# Patient Record
Sex: MALE | Race: WHITE | NOT HISPANIC OR LATINO | Employment: OTHER | URBAN - METROPOLITAN AREA
[De-identification: names, ages, dates, MRNs, and addresses within clinical notes are randomized per-mention and may not be internally consistent; named-entity substitution may affect disease eponyms.]

---

## 2019-08-13 RX ORDER — CLOPIDOGREL BISULFATE 75 MG/1
75 TABLET ORAL DAILY
Qty: 90 TABLET | Refills: 3 | Status: SHIPPED | OUTPATIENT
Start: 2019-08-13 | End: 2020-08-21

## 2019-08-13 RX ORDER — ATORVASTATIN CALCIUM 20 MG/1
20 TABLET, FILM COATED ORAL DAILY
Qty: 90 TABLET | Refills: 3 | Status: SHIPPED | OUTPATIENT
Start: 2019-08-13 | End: 2020-08-21

## 2019-08-13 RX ORDER — METOPROLOL TARTRATE 50 MG/1
50 TABLET, FILM COATED ORAL DAILY
Qty: 90 TABLET | Refills: 3 | Status: SHIPPED | OUTPATIENT
Start: 2019-08-13 | End: 2019-09-12

## 2019-09-12 RX ORDER — METOPROLOL SUCCINATE 50 MG/1
50 TABLET, EXTENDED RELEASE ORAL DAILY
Qty: 90 TABLET | Refills: 3 | Status: SHIPPED | OUTPATIENT
Start: 2019-09-12 | End: 2020-08-21

## 2019-09-12 NOTE — TELEPHONE ENCOUNTER
Patient called and l/m v/m that incorrect Metoprolol was sent to pharmacy. Stated he has been on Succ ER and what was recently sent was Tartrate .   Metoprolol rx corrected and resent.

## 2019-09-19 ENCOUNTER — OFFICE VISIT (OUTPATIENT)
Dept: CARDIOLOGY | Facility: CLINIC | Age: 69
End: 2019-09-19

## 2019-09-19 VITALS
DIASTOLIC BLOOD PRESSURE: 75 MMHG | RESPIRATION RATE: 18 BRPM | SYSTOLIC BLOOD PRESSURE: 145 MMHG | WEIGHT: 260 LBS | HEART RATE: 52 BPM | OXYGEN SATURATION: 97 %

## 2019-09-19 DIAGNOSIS — E78.5 DYSLIPIDEMIA: ICD-10-CM

## 2019-09-19 DIAGNOSIS — I11.9 HYPERTENSIVE HEART DISEASE WITHOUT HEART FAILURE: ICD-10-CM

## 2019-09-19 DIAGNOSIS — I25.10 CHRONIC CORONARY ARTERY DISEASE: ICD-10-CM

## 2019-09-19 DIAGNOSIS — I10 ESSENTIAL HYPERTENSION: ICD-10-CM

## 2019-09-19 DIAGNOSIS — I48.0 PAROXYSMAL ATRIAL FIBRILLATION (HCC): Primary | ICD-10-CM

## 2019-09-19 DIAGNOSIS — E66.9 CLASS 2 OBESITY WITHOUT SERIOUS COMORBIDITY WITH BODY MASS INDEX (BMI) OF 35.0 TO 35.9 IN ADULT, UNSPECIFIED OBESITY TYPE: ICD-10-CM

## 2019-09-19 DIAGNOSIS — D68.9 COAGULOPATHY (HCC): ICD-10-CM

## 2019-09-19 PROCEDURE — 99214 OFFICE O/P EST MOD 30 MIN: CPT | Performed by: INTERNAL MEDICINE

## 2019-09-19 PROCEDURE — 93000 ELECTROCARDIOGRAM COMPLETE: CPT | Performed by: INTERNAL MEDICINE

## 2019-09-19 NOTE — PROGRESS NOTES
Cardiology Office Visit      Encounter Date:  09/19/2019    Patient ID:   Nikolay Lam is a 68 y.o. male.    Reason For Followup:  Coronary artery disease  Paroxysmal atrial fibrillation  Hypertension  Coagulopathy    Brief Clinical History:  Dear Josep Edwards MD    I had the pleasure of seeing Nikolay Lam today. As you are well aware, this is a 68 y.o. male with a history of ischemic heart disease and proximal atrial fibrillation. He has additional history of dyslipidemia and hypertension. He presents today for follow-up on the above conditions.    Interval History:  He denies any chest pain pressure heaviness or tightness.  He denies any shortness of breath.  He denies any PND orthopnea.  He denies any syncope or near-syncope.  He reports feeling quite well from a cardiac perspective. His BP is better at home, 120 systolic this morning. 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    Assessment & Plan    Impressions:  Coronary artery disease status post PCI of the ostial right Coronary artery with a drug-eluting stent in December 2015.  Persistent AF with elevated Dell score. Now status post DC cardioversion. Maintaining sinus rhythm.  Coagulopathy secondary to  Eliquis  Hypertension with HTCV. Suboptimal today but was normal at PCP last week  Obesity.  Dyslipidemia.  Hypertension with hypertensive cardiovascular disease. - suboptimal, situational component noted.    Recommendations:  Continuation of his current medical regimen at the present time.  Follow-up in 6 months times initiate difficulties.    Objective:    Vitals:  Vitals:    09/19/19 1135   BP: 145/75   Pulse: 52   Resp: 18   SpO2: 97%   Weight: 118 kg (260 lb)       Physical Exam:    General: Alert, cooperative, no distress, appears stated age  Head:  Normocephalic, atraumatic, mucous membranes moist  Eyes:  Conjunctiva/corneas clear, EOM's intact     Neck:  Supple,  no adenopathy;      Lungs: Clear to  auscultation bilaterally, no wheezes rhonchi rales are noted  Chest wall: No tenderness  Heart::  Regular rate and rhythm, S1 and S2 normal, 2/6 holosystolic murmur.  No rub or gallop  Abdomen: Soft, non-tender, nondistended bowel sounds active  Extremities: No cyanosis, clubbing, or edema  Pulses: 2+ and symmetric all extremities  Skin:  No rashes or lesions  Neuro/psych: A&O x3. CN II through XII are grossly intact with appropriate affect      Allergies:  No Known Allergies    Medication Review:     Current Outpatient Medications:   •  atorvastatin (LIPITOR) 20 MG tablet, Take 1 tablet by mouth Daily., Disp: 90 tablet, Rfl: 3  •  Azilsartan-Chlorthalidone (EDARBYCLOR) 40-12.5 MG tablet, Take 1 tablet by mouth Daily., Disp: , Rfl:   •  clopidogrel (PLAVIX) 75 MG tablet, Take 1 tablet by mouth Daily., Disp: 90 tablet, Rfl: 3  •  metoprolol succinate XL (TOPROL-XL) 50 MG 24 hr tablet, Take 1 tablet by mouth Daily., Disp: 90 tablet, Rfl: 3    Family History:  Family History   Problem Relation Age of Onset   • Colon cancer Mother    • Parkinsonism Father        Past Medical History:  Past Medical History:   Diagnosis Date   • Atrial fibrillation (CMS/HCC)    • Coagulopathy (CMS/HCC)    • Coronary artery disease    • Dyslipidemia    • Hypertension    • Hypertensive cardiovascular disease    • Obesity        Past surgical History:  Past Surgical History:   Procedure Laterality Date   • CARDIAC CATHETERIZATION     • CARDIOVERSION  12/2015   • CORONARY ANGIOPLASTY WITH STENT PLACEMENT  12/18/2015    Xience Alpine stent to ostial RCA- [facility]   • KNEE SURGERY Left 01/04/2012   • SHOULDER SURGERY Left        Social History:  Social History     Socioeconomic History   • Marital status:      Spouse name: Not on file   • Number of children: Not on file   • Years of education: Not on file   • Highest education level: Not on file   Tobacco Use   • Smoking status: Former Smoker   • Smokeless tobacco: Never  Used   Substance and Sexual Activity   • Alcohol use: Yes     Frequency: 4 or more times a week   • Drug use: No       Review of Systems:  The following systems were reviewed as they relate to the cardiovascular system: Constitutional, Eyes, ENT, Cardiovascular, Respiratory, Gastrointestinal, Integumentary, Neurological, Psychiatric, Hematologic, Endocrine, Musculoskeletal, and Genitourinary. The pertinent cardiovascular findings are reported above with all other cardiovascular points within those systems being negative.    Diagnostic Study Review:     Current Electrocardiogram:    ECG 12 Lead  Date/Time: 9/19/2019 1:48 PM  Performed by: Adán Harding DO  Authorized by: Adán Harding DO   Comparison: not compared with previous ECG   Previous ECG: no previous ECG available  Comments: Sinus bradycardia with a ventricular rate of 53 bpm.  Early R wave transition.  Normal QT and QTc intervals.  Left axis deviation.            Most recent Cardiac Catheterization:  Date:  Findings:    Most Recent Echocardiogram:  Date:  Findings:    Most Recent Stress Test:  Date:  Findings:    Most Recent Ambulatory ECG Monitor:  Date:  Findings:    NOTE: The following portions of the patient's history were reviewed and updated this visit as appropriate: allergies, current medications, past family history, past medical history, past social history, past surgical history and problem list.

## 2019-09-20 ENCOUNTER — LAB (OUTPATIENT)
Dept: LAB | Facility: HOSPITAL | Age: 69
End: 2019-09-20

## 2019-09-20 DIAGNOSIS — I48.0 PAROXYSMAL ATRIAL FIBRILLATION (HCC): ICD-10-CM

## 2019-09-20 DIAGNOSIS — I10 ESSENTIAL HYPERTENSION: ICD-10-CM

## 2019-09-20 LAB
ALBUMIN SERPL-MCNC: 3.8 G/DL (ref 3.5–4.8)
ALBUMIN/GLOB SERPL: 1.5 G/DL (ref 1–1.7)
ALP SERPL-CCNC: 53 U/L (ref 32–91)
ALT SERPL W P-5'-P-CCNC: 17 U/L (ref 17–63)
ANION GAP SERPL CALCULATED.3IONS-SCNC: 12.4 MMOL/L (ref 5–15)
ARTICHOKE IGE QN: 101 MG/DL (ref 0–100)
AST SERPL-CCNC: 21 U/L (ref 15–41)
BASOPHILS # BLD AUTO: 0 10*3/MM3 (ref 0–0.2)
BASOPHILS NFR BLD AUTO: 0.6 % (ref 0–1.5)
BILIRUB SERPL-MCNC: 0.8 MG/DL (ref 0.3–1.2)
BUN BLD-MCNC: 19 MG/DL (ref 8–20)
BUN/CREAT SERPL: 19 (ref 6.2–20.3)
CALCIUM SPEC-SCNC: 8.8 MG/DL (ref 8.9–10.3)
CHLORIDE SERPL-SCNC: 105 MMOL/L (ref 101–111)
CHOLEST SERPL-MCNC: 165 MG/DL
CO2 SERPL-SCNC: 25 MMOL/L (ref 22–32)
CREAT BLD-MCNC: 1 MG/DL (ref 0.7–1.2)
DEPRECATED RDW RBC AUTO: 42.4 FL (ref 37–54)
EOSINOPHIL # BLD AUTO: 0.1 10*3/MM3 (ref 0–0.4)
EOSINOPHIL NFR BLD AUTO: 1.9 % (ref 0.3–6.2)
ERYTHROCYTE [DISTWIDTH] IN BLOOD BY AUTOMATED COUNT: 13.4 % (ref 12.3–15.4)
GFR SERPL CREATININE-BSD FRML MDRD: 74 ML/MIN/1.73
GLOBULIN UR ELPH-MCNC: 2.6 GM/DL (ref 2.5–3.8)
GLUCOSE BLD-MCNC: 114 MG/DL (ref 65–99)
HCT VFR BLD AUTO: 38.3 % (ref 37.5–51)
HDLC SERPL QL: 3
HDLC SERPL-MCNC: 55 MG/DL
HGB BLD-MCNC: 13.2 G/DL (ref 13–17.7)
LDLC/HDLC SERPL: 1.83 {RATIO}
LYMPHOCYTES # BLD AUTO: 1.3 10*3/MM3 (ref 0.7–3.1)
LYMPHOCYTES NFR BLD AUTO: 22.6 % (ref 19.6–45.3)
MCH RBC QN AUTO: 31.6 PG (ref 26.6–33)
MCHC RBC AUTO-ENTMCNC: 34.6 G/DL (ref 31.5–35.7)
MCV RBC AUTO: 91.5 FL (ref 79–97)
MONOCYTES # BLD AUTO: 0.5 10*3/MM3 (ref 0.1–0.9)
MONOCYTES NFR BLD AUTO: 9.2 % (ref 5–12)
NEUTROPHILS # BLD AUTO: 3.7 10*3/MM3 (ref 1.7–7)
NEUTROPHILS NFR BLD AUTO: 65.7 % (ref 42.7–76)
NRBC BLD AUTO-RTO: 0.1 /100 WBC (ref 0–0.2)
PLATELET # BLD AUTO: 215 10*3/MM3 (ref 140–450)
PMV BLD AUTO: 8.2 FL (ref 6–12)
POTASSIUM BLD-SCNC: 4.4 MMOL/L (ref 3.6–5.1)
PROT SERPL-MCNC: 6.4 G/DL (ref 6.1–7.9)
RBC # BLD AUTO: 4.18 10*6/MM3 (ref 4.14–5.8)
SODIUM BLD-SCNC: 138 MMOL/L (ref 136–144)
TRIGL SERPL-MCNC: 46 MG/DL
VLDLC SERPL-MCNC: 9.2 MG/DL
WBC NRBC COR # BLD: 5.6 10*3/MM3 (ref 3.4–10.8)

## 2019-09-20 PROCEDURE — 80053 COMPREHEN METABOLIC PANEL: CPT

## 2019-09-20 PROCEDURE — 80061 LIPID PANEL: CPT

## 2019-09-20 PROCEDURE — 85025 COMPLETE CBC W/AUTO DIFF WBC: CPT

## 2019-09-20 PROCEDURE — 36415 COLL VENOUS BLD VENIPUNCTURE: CPT

## 2020-04-17 ENCOUNTER — TELEMEDICINE (OUTPATIENT)
Dept: CARDIOLOGY | Facility: CLINIC | Age: 70
End: 2020-04-17

## 2020-04-17 VITALS — SYSTOLIC BLOOD PRESSURE: 129 MMHG | WEIGHT: 260 LBS | HEART RATE: 53 BPM | DIASTOLIC BLOOD PRESSURE: 71 MMHG

## 2020-04-17 DIAGNOSIS — I25.10 CHRONIC CORONARY ARTERY DISEASE: ICD-10-CM

## 2020-04-17 DIAGNOSIS — I11.9 HYPERTENSIVE HEART DISEASE WITHOUT HEART FAILURE: ICD-10-CM

## 2020-04-17 DIAGNOSIS — I48.0 PAROXYSMAL ATRIAL FIBRILLATION (HCC): Primary | ICD-10-CM

## 2020-04-17 DIAGNOSIS — D68.9 COAGULOPATHY (HCC): ICD-10-CM

## 2020-04-17 DIAGNOSIS — H92.01 EAR PAIN, RIGHT: ICD-10-CM

## 2020-04-17 DIAGNOSIS — I10 ESSENTIAL HYPERTENSION: ICD-10-CM

## 2020-04-17 DIAGNOSIS — E78.5 DYSLIPIDEMIA: ICD-10-CM

## 2020-04-17 PROCEDURE — 99214 OFFICE O/P EST MOD 30 MIN: CPT | Performed by: INTERNAL MEDICINE

## 2020-04-17 NOTE — PROGRESS NOTES
Cardiology Virtual Video Visit      Encounter Date:  04/17/2020    Patient ID:   Nikolay Lam is a 69 y.o. male.    Reason For Followup:  Coronary artery disease  Paroxysmal atrial fibrillation  Hypertension  Coagulopathy    Brief Clinical History:  Dear Josep Edwards MD    I had the pleasure of seeing Nikolay Lam today in a virtual video format. As you are well aware, this is a 69 y.o. male with a history of ischemic heart disease and proximal atrial fibrillation. He has additional history of dyslipidemia and hypertension. He presents today for follow-up on the above conditions.     Interval History:  He denies any chest pain pressure heaviness or tightness.  He denies any shortness of breath.  He denies any PND orthopnea.  He denies any syncope or near-syncope.  He reports feeling quite well from a cardiac perspective.     He is reporting some tingling around the rim of right ear. He reports it is intermittent and fleeting. It may last a few seconds and goes away.He reports no aggravating factors but does report that if he lays on his right side with his head down he has no symptoms. We discussed these symptoms. He has no associated signs or symptoms suggestive of cardiac or vascular etiology.  He will reach out to you to discuss further.     Assessment & Plan     Impressions:  Coronary artery disease status post PCI of the ostial right Coronary artery with a drug-eluting stent in December 2015.  Persistent AF with elevated Dell score. Now status post DC cardioversion. Maintaining sinus rhythm.  Coagulopathy secondary to  Eliquis  Hypertension with HTCV. Suboptimal today but was normal at PCP last week  Obesity.  Dyslipidemia.  Hypertension with hypertensive cardiovascular disease. - suboptimal, situational component noted.     Recommendations:  Continuation of his current medical regimen at the present time.  Follow-up in 6 months times initiate difficulties.    Objective:    Vitals:  Vitals:     04/17/20 1002   BP: 129/71   Pulse: 53   Weight: 118 kg (260 lb)         Allergies:  No Known Allergies    Medication Review:     Current Outpatient Medications:   •  atorvastatin (LIPITOR) 20 MG tablet, Take 1 tablet by mouth Daily., Disp: 90 tablet, Rfl: 3  •  Azilsartan-Chlorthalidone (EDARBYCLOR) 40-12.5 MG tablet, Take 1 tablet by mouth Daily., Disp: , Rfl:   •  clopidogrel (PLAVIX) 75 MG tablet, Take 1 tablet by mouth Daily., Disp: 90 tablet, Rfl: 3  •  metoprolol succinate XL (TOPROL-XL) 50 MG 24 hr tablet, Take 1 tablet by mouth Daily., Disp: 90 tablet, Rfl: 3    Family History:  Family History   Problem Relation Age of Onset   • Colon cancer Mother    • Parkinsonism Father        Past Medical History:  Past Medical History:   Diagnosis Date   • Atrial fibrillation (CMS/HCC)    • Coagulopathy (CMS/HCC)    • Coronary artery disease    • Dyslipidemia    • Hypertension    • Hypertensive cardiovascular disease    • Obesity        Past surgical History:  Past Surgical History:   Procedure Laterality Date   • CARDIAC CATHETERIZATION     • CARDIOVERSION  12/2015   • CORONARY ANGIOPLASTY WITH STENT PLACEMENT  12/18/2015    Xience Alpine stent to ostial RCA- [facility]   • KNEE SURGERY Left 01/04/2012   • SHOULDER SURGERY Left        Social History:  Social History     Socioeconomic History   • Marital status:      Spouse name: Not on file   • Number of children: Not on file   • Years of education: Not on file   • Highest education level: Not on file   Tobacco Use   • Smoking status: Former Smoker   • Smokeless tobacco: Never Used   Substance and Sexual Activity   • Alcohol use: Yes     Frequency: 4 or more times a week   • Drug use: No       Review of Systems:  The following systems were reviewed as they relate to the cardiovascular system: Constitutional, Eyes, ENT, Cardiovascular, Respiratory, Gastrointestinal, Integumentary, Neurological, Psychiatric, Hematologic, Endocrine, Musculoskeletal,  and Genitourinary. The pertinent cardiovascular findings are reported above with all other cardiovascular points within those systems being negative.    Diagnostic Study Review:     Current Electrocardiogram:  Procedures      NOTE: The following portions of the patient's history were reviewed and updated this visit as appropriate: allergies, current medications, past family history, past medical history, past social history, past surgical history and problem list.    A total of 25 minutes of medical discussion occurred during this encounter.      Novel Coronavirus (COVID-19) Disclaimer:     A proclamation declaring a national emergency concerning the Novel Coronavirus Disease (COVID-19) Outbreak was issued on March 13, 2020 at the direction of the .    This virtual visit was performed with the patient's consent in lieu of the patient's regularly scheduled appointment in order to provide the patient with the opportunity to maintain contact with their healthcare provider while also adhering to social distancing guidelines set forth by the CDC to reduce exposure to the Novel Coronavirus (COVID-19).  Any vital signs obtained during this visit were provided by the patient.

## 2020-05-21 RX ORDER — AZILSARTAN KAMEDOXOMIL AND CHLORTHALIDONE 40; 12.5 MG/1; MG/1
TABLET ORAL
Qty: 90 TABLET | Refills: 4 | Status: SHIPPED | OUTPATIENT
Start: 2020-05-21 | End: 2021-05-13

## 2020-07-10 ENCOUNTER — OFFICE (AMBULATORY)
Dept: URBAN - METROPOLITAN AREA PATHOLOGY 4 | Facility: PATHOLOGY | Age: 70
End: 2020-07-10
Payer: COMMERCIAL

## 2020-07-10 ENCOUNTER — OFFICE (AMBULATORY)
Dept: URBAN - METROPOLITAN AREA PATHOLOGY 4 | Facility: PATHOLOGY | Age: 70
End: 2020-07-10
Payer: MEDICARE

## 2020-07-10 ENCOUNTER — ON CAMPUS - OUTPATIENT (AMBULATORY)
Dept: URBAN - METROPOLITAN AREA HOSPITAL 2 | Facility: HOSPITAL | Age: 70
End: 2020-07-10
Payer: MEDICARE

## 2020-07-10 VITALS
HEIGHT: 72 IN | SYSTOLIC BLOOD PRESSURE: 94 MMHG | SYSTOLIC BLOOD PRESSURE: 128 MMHG | RESPIRATION RATE: 80 BRPM | DIASTOLIC BLOOD PRESSURE: 56 MMHG | RESPIRATION RATE: 18 BRPM | DIASTOLIC BLOOD PRESSURE: 60 MMHG | DIASTOLIC BLOOD PRESSURE: 64 MMHG | OXYGEN SATURATION: 99 % | OXYGEN SATURATION: 98 % | OXYGEN SATURATION: 95 % | RESPIRATION RATE: 14 BRPM | DIASTOLIC BLOOD PRESSURE: 40 MMHG | WEIGHT: 260 LBS | RESPIRATION RATE: 16 BRPM | SYSTOLIC BLOOD PRESSURE: 112 MMHG | DIASTOLIC BLOOD PRESSURE: 68 MMHG | HEART RATE: 53 BPM | SYSTOLIC BLOOD PRESSURE: 92 MMHG | SYSTOLIC BLOOD PRESSURE: 100 MMHG | DIASTOLIC BLOOD PRESSURE: 58 MMHG | SYSTOLIC BLOOD PRESSURE: 95 MMHG | HEART RATE: 54 BPM | HEART RATE: 60 BPM | DIASTOLIC BLOOD PRESSURE: 62 MMHG | DIASTOLIC BLOOD PRESSURE: 50 MMHG | SYSTOLIC BLOOD PRESSURE: 91 MMHG | TEMPERATURE: 97.7 F | HEART RATE: 46 BPM

## 2020-07-10 DIAGNOSIS — D12.4 BENIGN NEOPLASM OF DESCENDING COLON: ICD-10-CM

## 2020-07-10 DIAGNOSIS — K63.5 POLYP OF COLON: ICD-10-CM

## 2020-07-10 DIAGNOSIS — Z12.11 ENCOUNTER FOR SCREENING FOR MALIGNANT NEOPLASM OF COLON: ICD-10-CM

## 2020-07-10 DIAGNOSIS — Z80.0 FAMILY HISTORY OF MALIGNANT NEOPLASM OF DIGESTIVE ORGANS: ICD-10-CM

## 2020-07-10 DIAGNOSIS — K64.0 FIRST DEGREE HEMORRHOIDS: ICD-10-CM

## 2020-07-10 DIAGNOSIS — K62.89 OTHER SPECIFIED DISEASES OF ANUS AND RECTUM: ICD-10-CM

## 2020-07-10 DIAGNOSIS — D12.0 BENIGN NEOPLASM OF CECUM: ICD-10-CM

## 2020-07-10 LAB
GI HISTOLOGY: A. UNSPECIFIED: (no result)
GI HISTOLOGY: B. UNSPECIFIED: (no result)
GI HISTOLOGY: PDF REPORT: (no result)

## 2020-07-10 PROCEDURE — 88305 TISSUE EXAM BY PATHOLOGIST: CPT | Mod: 26 | Performed by: INTERNAL MEDICINE

## 2020-07-10 PROCEDURE — 45380 COLONOSCOPY AND BIOPSY: CPT | Performed by: INTERNAL MEDICINE

## 2020-08-21 RX ORDER — METOPROLOL SUCCINATE 50 MG/1
TABLET, EXTENDED RELEASE ORAL
Qty: 90 TABLET | Refills: 1 | Status: SHIPPED | OUTPATIENT
Start: 2020-08-21 | End: 2021-02-16 | Stop reason: SDUPTHER

## 2020-08-21 RX ORDER — CLOPIDOGREL BISULFATE 75 MG/1
TABLET ORAL
Qty: 90 TABLET | Refills: 1 | Status: SHIPPED | OUTPATIENT
Start: 2020-08-21 | End: 2021-02-16 | Stop reason: SDUPTHER

## 2020-08-21 RX ORDER — ATORVASTATIN CALCIUM 20 MG/1
TABLET, FILM COATED ORAL
Qty: 90 TABLET | Refills: 1 | Status: SHIPPED | OUTPATIENT
Start: 2020-08-21 | End: 2021-02-16 | Stop reason: SDUPTHER

## 2020-10-20 PROBLEM — H35.379 EPIRETINAL MEMBRANE: Status: ACTIVE | Noted: 2017-07-13

## 2020-10-20 PROBLEM — H25.819 COMBINED FORM OF SENILE CATARACT: Status: ACTIVE | Noted: 2017-06-01

## 2020-10-20 PROBLEM — H43.819 VITREOUS DEGENERATION: Status: ACTIVE | Noted: 2017-06-01

## 2020-10-20 PROBLEM — H34.8390 BRANCH RETINAL VEIN OCCLUSION WITH MACULAR EDEMA: Status: ACTIVE | Noted: 2017-06-01

## 2020-10-20 RX ORDER — ASPIRIN 325 MG
TABLET ORAL
COMMUNITY

## 2020-10-20 RX ORDER — VALSARTAN AND HYDROCHLOROTHIAZIDE 160; 12.5 MG/1; MG/1
1 TABLET, FILM COATED ORAL DAILY
COMMUNITY
End: 2020-10-29

## 2020-10-20 RX ORDER — CHLORAL HYDRATE 500 MG
CAPSULE ORAL
COMMUNITY

## 2020-10-29 ENCOUNTER — OFFICE VISIT (OUTPATIENT)
Dept: CARDIOLOGY | Facility: CLINIC | Age: 70
End: 2020-10-29

## 2020-10-29 VITALS
SYSTOLIC BLOOD PRESSURE: 148 MMHG | DIASTOLIC BLOOD PRESSURE: 79 MMHG | WEIGHT: 271 LBS | HEART RATE: 57 BPM | OXYGEN SATURATION: 97 %

## 2020-10-29 DIAGNOSIS — I25.10 CHRONIC CORONARY ARTERY DISEASE: Primary | ICD-10-CM

## 2020-10-29 DIAGNOSIS — I11.9 HYPERTENSIVE HEART DISEASE WITHOUT HEART FAILURE: ICD-10-CM

## 2020-10-29 DIAGNOSIS — I48.0 PAROXYSMAL ATRIAL FIBRILLATION (HCC): ICD-10-CM

## 2020-10-29 DIAGNOSIS — I10 ESSENTIAL HYPERTENSION: ICD-10-CM

## 2020-10-29 PROCEDURE — 99214 OFFICE O/P EST MOD 30 MIN: CPT | Performed by: INTERNAL MEDICINE

## 2020-10-29 NOTE — PROGRESS NOTES
Cardiology Office Visit      Encounter Date:  10/29/2020    Patient ID:   Nikolay Lam is a 69 y.o. male.    Reason For Followup:  Coronary artery disease  Paroxysmal atrial fibrillation    Brief Clinical History:  Dear Josep Edwards MD    I had the pleasure of seeing Nikolay Lam today. As you are well aware, this is a 69 y.o. male with a history of ischemic heart disease and proximal atrial fibrillation. He has additional history of dyslipidemia and hypertension. He presents today for follow-up on the above conditions.     Interval History:  He denies any chest pain pressure heaviness or tightness.  He denies any shortness of breath.  He denies any PND orthopnea.  He denies any syncope or near-syncope.  He reports feeling quite well from a cardiac perspective.    His blood pressure is elevated today however at home it was 131 systolic.  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     Assessment & Plan     Impressions:  Coronary artery disease status post PCI of the ostial right Coronary artery with a drug-eluting stent in December 2015.  Persistent AF with elevated Dell score. Now status post DC cardioversion. Maintaining sinus rhythm.  Coagulopathy secondary to  Eliquis  Hypertension with HTCV. Suboptimal today but was normal at PCP last week  Obesity.  Dyslipidemia.  Hypertension with hypertensive cardiovascular disease. - suboptimal, situational component noted.     Recommendations:  Continuation of his current medical regimen at the present time.  Follow-up in 6 months times initiate difficulties.    Objective:    Vitals:  Vitals:    10/29/20 0945   BP: 148/79   Pulse: 57   SpO2: 97%   Weight: 123 kg (271 lb)       Physical Exam:    General: Alert, cooperative, no distress, appears stated age  Head:  Normocephalic, atraumatic, mucous membranes moist  Eyes:  Conjunctiva/corneas clear, EOM's intact     Neck:  Supple,  no bruit  Lungs: Clear to auscultation bilaterally, no wheezes  rhonchi rales are noted  Chest wall: No tenderness  Heart::  Regular rate and rhythm, S1 and S2 normal, 1/6 holosystolic murmur.  No rub or gallop  Abdomen: Soft, non-tender, nondistended bowel sounds active  Extremities: No cyanosis, clubbing, or edema  Pulses: 2+ and symmetric all extremities  Skin:  No rashes or lesions  Neuro/psych: A&O x3. CN II through XII are grossly intact with appropriate affect      Allergies:  No Known Allergies    Medication Review:     Current Outpatient Medications:   •  aspirin 325 MG tablet, ASPIRIN 325 MG TABS, Disp: , Rfl:   •  atorvastatin (LIPITOR) 20 MG tablet, Take 1 tablet by mouth once daily, Disp: 90 tablet, Rfl: 1  •  clopidogrel (PLAVIX) 75 MG tablet, Take 1 tablet by mouth once daily, Disp: 90 tablet, Rfl: 1  •  EDARBYCLOR 40-12.5 MG tablet, TAKE ONE TABLET BY MOUTH DAILY , Disp: 90 tablet, Rfl: 4  •  metoprolol succinate XL (TOPROL-XL) 50 MG 24 hr tablet, Take 1 tablet by mouth once daily, Disp: 90 tablet, Rfl: 1  •  Omega-3 Fatty Acids (fish oil) 1000 MG capsule capsule, FISH OIL 1000 MG CAPS, Disp: , Rfl:   •  valsartan-hydrochlorothiazide (DIOVAN-HCT) 160-12.5 MG per tablet, Take 1 tablet by mouth Daily., Disp: , Rfl:     Family History:  Family History   Problem Relation Age of Onset   • Colon cancer Mother    • Parkinsonism Father        Past Medical History:  Past Medical History:   Diagnosis Date   • Atrial fibrillation (CMS/HCC)    • Coagulopathy (CMS/HCC)    • Coronary artery disease    • Dyslipidemia    • Hypertension    • Hypertensive cardiovascular disease    • Obesity        Past surgical History:  Past Surgical History:   Procedure Laterality Date   • CARDIAC CATHETERIZATION     • CARDIOVERSION  12/2015   • CORONARY ANGIOPLASTY WITH STENT PLACEMENT  12/18/2015    Xience Alpine stent to ostial RCA- [facility]   • KNEE SURGERY Left 01/04/2012   • SHOULDER SURGERY Left        Social History:  Social History     Socioeconomic History   • Marital  status:      Spouse name: Not on file   • Number of children: Not on file   • Years of education: Not on file   • Highest education level: Not on file   Tobacco Use   • Smoking status: Former Smoker   • Smokeless tobacco: Never Used   Substance and Sexual Activity   • Alcohol use: Yes     Frequency: 4 or more times a week   • Drug use: No       Review of Systems:  The following systems were reviewed as they relate to the cardiovascular system: Constitutional, Eyes, ENT, Cardiovascular, Respiratory, Gastrointestinal, Integumentary, Neurological, Psychiatric, Hematologic, Endocrine, Musculoskeletal, and Genitourinary. The pertinent cardiovascular findings are reported above with all other cardiovascular points within those systems being negative.    Diagnostic Study Review:     Current Electrocardiogram:    ECG 12 Lead    Date/Time: 10/30/2020 8:18 AM  Performed by: Adán Harding DO  Authorized by: Adán Harding DO   Comparison: not compared with previous ECG   Previous ECG: no previous ECG available  Comments: Normal sinus rhythm with a ventricular rate of 59 bpm.  Normal QT and QTc intervals.              NOTE: The following portions of the patient's history were reviewed and updated this visit as appropriate: allergies, current medications, past family history, past medical history, past social history, past surgical history and problem list.

## 2020-10-30 PROCEDURE — 93000 ELECTROCARDIOGRAM COMPLETE: CPT | Performed by: INTERNAL MEDICINE

## 2021-02-16 RX ORDER — ATORVASTATIN CALCIUM 20 MG/1
20 TABLET, FILM COATED ORAL DAILY
Qty: 90 TABLET | Refills: 1 | Status: SHIPPED | OUTPATIENT
Start: 2021-02-16 | End: 2021-08-12 | Stop reason: SDUPTHER

## 2021-02-16 RX ORDER — CLOPIDOGREL BISULFATE 75 MG/1
75 TABLET ORAL DAILY
Qty: 90 TABLET | Refills: 1 | Status: SHIPPED | OUTPATIENT
Start: 2021-02-16 | End: 2021-08-12 | Stop reason: SDUPTHER

## 2021-02-16 RX ORDER — METOPROLOL SUCCINATE 50 MG/1
50 TABLET, EXTENDED RELEASE ORAL DAILY
Qty: 90 TABLET | Refills: 1 | Status: SHIPPED | OUTPATIENT
Start: 2021-02-16 | End: 2021-08-12 | Stop reason: SDUPTHER

## 2021-04-29 ENCOUNTER — OFFICE VISIT (OUTPATIENT)
Dept: CARDIOLOGY | Facility: CLINIC | Age: 71
End: 2021-04-29

## 2021-04-29 VITALS
SYSTOLIC BLOOD PRESSURE: 155 MMHG | OXYGEN SATURATION: 95 % | BODY MASS INDEX: 36.98 KG/M2 | HEIGHT: 72 IN | RESPIRATION RATE: 18 BRPM | WEIGHT: 273 LBS | DIASTOLIC BLOOD PRESSURE: 71 MMHG | HEART RATE: 63 BPM

## 2021-04-29 DIAGNOSIS — I10 ESSENTIAL HYPERTENSION: ICD-10-CM

## 2021-04-29 DIAGNOSIS — D68.9 COAGULOPATHY (HCC): ICD-10-CM

## 2021-04-29 DIAGNOSIS — N52.9 ERECTILE DYSFUNCTION, UNSPECIFIED ERECTILE DYSFUNCTION TYPE: ICD-10-CM

## 2021-04-29 DIAGNOSIS — I48.0 PAROXYSMAL ATRIAL FIBRILLATION (HCC): Primary | ICD-10-CM

## 2021-04-29 DIAGNOSIS — I11.9 HYPERTENSIVE HEART DISEASE WITHOUT HEART FAILURE: ICD-10-CM

## 2021-04-29 DIAGNOSIS — I25.10 CHRONIC CORONARY ARTERY DISEASE: ICD-10-CM

## 2021-04-29 DIAGNOSIS — M25.561 RIGHT KNEE PAIN, UNSPECIFIED CHRONICITY: ICD-10-CM

## 2021-04-29 PROCEDURE — 93000 ELECTROCARDIOGRAM COMPLETE: CPT | Performed by: INTERNAL MEDICINE

## 2021-04-29 PROCEDURE — 99214 OFFICE O/P EST MOD 30 MIN: CPT | Performed by: INTERNAL MEDICINE

## 2021-04-29 NOTE — PROGRESS NOTES
Cardiology Office Visit      Encounter Date:  04/29/2021    Patient ID:   Nikolay Lam is a 70 y.o. male.    Reason For Followup:  Coronary artery disease  Paroxysmal atrial fibrillation    Brief Clinical History:  Dear Josep Edwards MD    I had the pleasure of seeing Nikolay Lam today. As you are well aware, this is a 70 y.o. male with a history of ischemic heart disease and proximal atrial fibrillation. He has additional history of dyslipidemia and hypertension. He presents today for follow-up on the above conditions.     Interval History:  He denies any chest pain pressure heaviness or tightness.  He denies any shortness of breath.  He denies any PND orthopnea.  He denies any syncope or near-syncope.  He reports feeling quite well from a cardiac perspective.    His blood pressure is elevated today however at home traditionally it runs much lower.  He does have some discomfort in his right knee.  He is contemplating surgery.  This may be contributing to his elevated blood pressure as well.  He has an appointment to see orthopedics next week and will decide at that time whether to move forward with surgery or not.  If he needs surgery he will need an ischemic work-up.    He also is reporting some difficulty with occasional erectile dysfunction.  We reviewed his meds and his clinical conditions.  I see no contraindication for phosphodiesterase inhibitors should he decide to move forward with those.     Assessment & Plan     Impressions:  Coronary artery disease status post PCI of the ostial right Coronary artery with a drug-eluting stent in December 2015.  Persistent AF with elevated Dell score. Now status post DC cardioversion. Maintaining sinus rhythm.  Coagulopathy secondary to  Eliquis  Hypertension with HTCV. Suboptimal today but was normal at PCP last week  Obesity.  Dyslipidemia.  Hypertension with hypertensive cardiovascular disease. - suboptimal, situational component noted.  Erectile  "dysfunction     Recommendations:  Continuation of his current medical regimen at the present time.  Notify us if you decide to move forward with surgery-will need ischemic work-up for a stratification  Low risk for complications if patient chooses to utilize phosphodiesterase inhibitors  Follow-up in 6 months times initiate difficulties.    Objective:    Vitals:  Vitals:    04/29/21 0951   BP: 155/71   BP Location: Left arm   Patient Position: Sitting   Cuff Size: Large Adult   Pulse: 63   Resp: 18   SpO2: 95%   Weight: 124 kg (273 lb)   Height: 182.9 cm (72\")       Physical Exam:    General: Alert, cooperative, no distress, appears stated age  Head:  Normocephalic, atraumatic, mucous membranes moist  Eyes:  Conjunctiva/corneas clear, EOM's intact     Neck:  Supple,  no bruit  Lungs: Clear to auscultation bilaterally, no wheezes rhonchi rales are noted  Chest wall: No tenderness  Heart::  Regular rate and rhythm, S1 and S2 normal, 1/6 holosystolic murmur.  No rub or gallop  Abdomen: Soft, non-tender, nondistended bowel sounds active  Extremities: No cyanosis, clubbing, or edema  Pulses: 2+ and symmetric all extremities  Skin:  No rashes or lesions  Neuro/psych: A&O x3. CN II through XII are grossly intact with appropriate affect      Allergies:  No Known Allergies    Medication Review:     Current Outpatient Medications:   •  aspirin 325 MG tablet, ASPIRIN 325 MG TABS, Disp: , Rfl:   •  atorvastatin (LIPITOR) 20 MG tablet, Take 1 tablet by mouth Daily., Disp: 90 tablet, Rfl: 1  •  clopidogrel (PLAVIX) 75 MG tablet, Take 1 tablet by mouth Daily., Disp: 90 tablet, Rfl: 1  •  EDARBYCLOR 40-12.5 MG tablet, TAKE ONE TABLET BY MOUTH DAILY , Disp: 90 tablet, Rfl: 4  •  metoprolol succinate XL (TOPROL-XL) 50 MG 24 hr tablet, Take 1 tablet by mouth Daily., Disp: 90 tablet, Rfl: 1  •  Omega-3 Fatty Acids (fish oil) 1000 MG capsule capsule, FISH OIL 1000 MG CAPS, Disp: , Rfl:     Family History:  Family History   Problem " Relation Age of Onset   • Colon cancer Mother    • Parkinsonism Father        Past Medical History:  Past Medical History:   Diagnosis Date   • Atrial fibrillation (CMS/HCC)    • Coagulopathy (CMS/HCC)    • Coronary artery disease    • Dyslipidemia    • Hypertension    • Hypertensive cardiovascular disease    • Obesity        Past surgical History:  Past Surgical History:   Procedure Laterality Date   • CARDIAC CATHETERIZATION     • CARDIOVERSION  12/2015   • CORONARY ANGIOPLASTY WITH STENT PLACEMENT  12/18/2015    Xience Alpine stent to ostial RCA- [facility]   • KNEE SURGERY Left 01/04/2012   • SHOULDER SURGERY Left        Social History:  Social History     Socioeconomic History   • Marital status:      Spouse name: Not on file   • Number of children: Not on file   • Years of education: Not on file   • Highest education level: Not on file   Tobacco Use   • Smoking status: Former Smoker   • Smokeless tobacco: Never Used   Vaping Use   • Vaping Use: Never used   Substance and Sexual Activity   • Alcohol use: Yes   • Drug use: No       Review of Systems:  The following systems were reviewed as they relate to the cardiovascular system: Constitutional, Eyes, ENT, Cardiovascular, Respiratory, Gastrointestinal, Integumentary, Neurological, Psychiatric, Hematologic, Endocrine, Musculoskeletal, and Genitourinary. The pertinent cardiovascular findings are reported above with all other cardiovascular points within those systems being negative.    Diagnostic Study Review:     Current Electrocardiogram:    ECG 12 Lead    Date/Time: 4/29/2021 12:37 PM  Performed by: Adán Harding DO  Authorized by: Adán Harding DO   Comparison: not compared with previous ECG   Previous ECG: no previous ECG available  Comments: Normal sinus rhythm with a ventricular to 57 bpm.  Early R wave transition.  Normal QT and QTC intervals.  Normal QRS axis.              NOTE: The following portions of  the patient's note were reviewed, confirmed and/or updated this visit as appropriate: History of present illness/Interval history, physical examination, assessment & plan, allergies, current medications, past family history, past medical history, past social history, past surgical history and problem list.

## 2021-05-06 DIAGNOSIS — Z01.810 PREOPERATIVE CARDIOVASCULAR EXAMINATION: Primary | ICD-10-CM

## 2021-05-07 ENCOUNTER — TELEPHONE (OUTPATIENT)
Dept: CARDIOLOGY | Facility: CLINIC | Age: 71
End: 2021-05-07

## 2021-05-07 NOTE — TELEPHONE ENCOUNTER
Pt wife is aware the orders are placed, and MORGAN Omer,  was notified to schedule the testing. She will call the office with any further questions.

## 2021-05-07 NOTE — TELEPHONE ENCOUNTER
----- Message from Adán Harding DO sent at 5/6/2021  9:03 AM EDT -----  Done  ----- Message -----  From: Bailey Morrissey MA  Sent: 5/5/2021   1:50 PM EDT  To: Adán Harding DO    Pt spouse called stating the pt would like to proceed with surgery. She is wanting to schedule the stress test, can you put in the order?

## 2021-05-13 RX ORDER — AZILSARTAN KAMEDOXOMIL AND CHLORTHALIDONE 40; 12.5 MG/1; MG/1
TABLET ORAL
Qty: 90 TABLET | Refills: 3 | Status: SHIPPED | OUTPATIENT
Start: 2021-05-13 | End: 2021-08-12 | Stop reason: SDUPTHER

## 2021-05-26 ENCOUNTER — HOSPITAL ENCOUNTER (OUTPATIENT)
Dept: NUCLEAR MEDICINE | Facility: HOSPITAL | Age: 71
Discharge: HOME | End: 2021-05-26

## 2021-05-26 ENCOUNTER — HOSPITAL ENCOUNTER (OUTPATIENT)
Dept: CARDIOLOGY | Facility: HOSPITAL | Age: 71
Discharge: HOME | End: 2021-05-26

## 2021-05-26 DIAGNOSIS — Z01.810 PREOPERATIVE CARDIOVASCULAR EXAMINATION: ICD-10-CM

## 2021-05-26 LAB
BH CV REST NUCLEAR ISOTOPE DOSE: 7.9 MCI
BH CV STRESS BP STAGE 1: NORMAL
BH CV STRESS BP STAGE 2: NORMAL
BH CV STRESS COMMENTS STAGE 1: NORMAL
BH CV STRESS COMMENTS STAGE 2: NORMAL
BH CV STRESS DOSE REGADENOSON STAGE 1: 0.4
BH CV STRESS DURATION MIN STAGE 1: 0
BH CV STRESS DURATION MIN STAGE 2: 4
BH CV STRESS DURATION SEC STAGE 1: 10
BH CV STRESS DURATION SEC STAGE 2: 0
BH CV STRESS HR STAGE 1: 73
BH CV STRESS HR STAGE 2: 61
BH CV STRESS NUCLEAR ISOTOPE DOSE: 21.9 MCI
BH CV STRESS PROTOCOL 1: NORMAL
BH CV STRESS RECOVERY BP: NORMAL MMHG
BH CV STRESS RECOVERY HR: 61 BPM
BH CV STRESS STAGE 1: 1
BH CV STRESS STAGE 2: 2
LV EF NUC BP: 60 %
MAXIMAL PREDICTED HEART RATE: 148 BPM
PERCENT MAX PREDICTED HR: 49.32 %
STRESS BASELINE BP: NORMAL MMHG
STRESS BASELINE HR: 53 BPM
STRESS PERCENT HR: 58 %
STRESS POST PEAK BP: NORMAL MMHG
STRESS POST PEAK HR: 73 BPM
STRESS TARGET HR: 126 BPM

## 2021-05-26 PROCEDURE — 93306 TTE W/DOPPLER COMPLETE: CPT | Performed by: INTERNAL MEDICINE

## 2021-05-26 PROCEDURE — 0 TECHNETIUM SESTAMIBI: Performed by: INTERNAL MEDICINE

## 2021-05-26 PROCEDURE — 93306 TTE W/DOPPLER COMPLETE: CPT

## 2021-05-26 PROCEDURE — 25010000002 REGADENOSON 0.4 MG/5ML SOLUTION: Performed by: INTERNAL MEDICINE

## 2021-05-26 PROCEDURE — 78452 HT MUSCLE IMAGE SPECT MULT: CPT

## 2021-05-26 PROCEDURE — 93016 CV STRESS TEST SUPVJ ONLY: CPT | Performed by: INTERNAL MEDICINE

## 2021-05-26 PROCEDURE — 93018 CV STRESS TEST I&R ONLY: CPT | Performed by: INTERNAL MEDICINE

## 2021-05-26 PROCEDURE — 78452 HT MUSCLE IMAGE SPECT MULT: CPT | Performed by: INTERNAL MEDICINE

## 2021-05-26 PROCEDURE — A9500 TC99M SESTAMIBI: HCPCS | Performed by: INTERNAL MEDICINE

## 2021-05-26 PROCEDURE — 93017 CV STRESS TEST TRACING ONLY: CPT

## 2021-05-26 RX ADMIN — TECHNETIUM TC 99M SESTAMIBI 1 DOSE: 1 INJECTION INTRAVENOUS at 07:50

## 2021-05-26 RX ADMIN — REGADENOSON 0.4 MG: 0.08 INJECTION, SOLUTION INTRAVENOUS at 09:46

## 2021-05-26 RX ADMIN — TECHNETIUM TC 99M SESTAMIBI 1 DOSE: 1 INJECTION INTRAVENOUS at 09:46

## 2021-05-27 DIAGNOSIS — I77.9 AORTIC DISEASE (HCC): Primary | ICD-10-CM

## 2021-05-27 LAB
BH CV ECHO MEAS - ACS: 2.5 CM
BH CV ECHO MEAS - AO MAX PG (FULL): 0.56 MMHG
BH CV ECHO MEAS - AO MAX PG: 6.8 MMHG
BH CV ECHO MEAS - AO MEAN PG (FULL): 0.05 MMHG
BH CV ECHO MEAS - AO MEAN PG: 2.7 MMHG
BH CV ECHO MEAS - AO ROOT AREA (BSA CORRECTED): 1.6
BH CV ECHO MEAS - AO ROOT AREA: 12 CM^2
BH CV ECHO MEAS - AO ROOT DIAM: 3.9 CM
BH CV ECHO MEAS - AO V2 MAX: 130.6 CM/SEC
BH CV ECHO MEAS - AO V2 MEAN: 75 CM/SEC
BH CV ECHO MEAS - AO V2 VTI: 29.1 CM
BH CV ECHO MEAS - ASC AORTA: 4.5 CM
BH CV ECHO MEAS - AVA(I,A): 4.8 CM^2
BH CV ECHO MEAS - AVA(I,D): 4.8 CM^2
BH CV ECHO MEAS - AVA(V,A): 4.5 CM^2
BH CV ECHO MEAS - AVA(V,D): 4.5 CM^2
BH CV ECHO MEAS - BSA(HAYCOCK): 2.6 M^2
BH CV ECHO MEAS - BSA: 2.4 M^2
BH CV ECHO MEAS - BZI_BMI: 37 KILOGRAMS/M^2
BH CV ECHO MEAS - BZI_METRIC_HEIGHT: 182.9 CM
BH CV ECHO MEAS - BZI_METRIC_WEIGHT: 123.8 KG
BH CV ECHO MEAS - EDV(CUBED): 111.8 ML
BH CV ECHO MEAS - EDV(MOD-SP4): 124.4 ML
BH CV ECHO MEAS - EDV(TEICH): 108.5 ML
BH CV ECHO MEAS - EF(CUBED): 75.9 %
BH CV ECHO MEAS - EF(MOD-BP): 66 %
BH CV ECHO MEAS - EF(MOD-SP4): 66.3 %
BH CV ECHO MEAS - EF(TEICH): 67.8 %
BH CV ECHO MEAS - ESV(CUBED): 27 ML
BH CV ECHO MEAS - ESV(MOD-SP4): 42 ML
BH CV ECHO MEAS - ESV(TEICH): 35 ML
BH CV ECHO MEAS - FS: 37.8 %
BH CV ECHO MEAS - IVS/LVPW: 1.1
BH CV ECHO MEAS - IVSD: 1.5 CM
BH CV ECHO MEAS - LA DIMENSION(2D): 4.9 CM
BH CV ECHO MEAS - LA DIMENSION: 5.1 CM
BH CV ECHO MEAS - LA/AO: 1.3
BH CV ECHO MEAS - LV DIASTOLIC VOL/BSA (35-75): 51.1 ML/M^2
BH CV ECHO MEAS - LV MASS(C)D: 294.3 GRAMS
BH CV ECHO MEAS - LV MASS(C)DI: 121 GRAMS/M^2
BH CV ECHO MEAS - LV MAX PG: 6.3 MMHG
BH CV ECHO MEAS - LV MEAN PG: 2.7 MMHG
BH CV ECHO MEAS - LV SYSTOLIC VOL/BSA (12-30): 17.3 ML/M^2
BH CV ECHO MEAS - LV V1 MAX: 125.1 CM/SEC
BH CV ECHO MEAS - LV V1 MEAN: 74.7 CM/SEC
BH CV ECHO MEAS - LV V1 VTI: 29.7 CM
BH CV ECHO MEAS - LVIDD: 4.8 CM
BH CV ECHO MEAS - LVIDS: 3 CM
BH CV ECHO MEAS - LVOT AREA: 4.7 CM^2
BH CV ECHO MEAS - LVOT DIAM: 2.5 CM
BH CV ECHO MEAS - LVPWD: 1.4 CM
BH CV ECHO MEAS - MV A MAX VEL: 89.8 CM/SEC
BH CV ECHO MEAS - MV DEC SLOPE: 507.1 CM/SEC^2
BH CV ECHO MEAS - MV DEC TIME: 0.19 SEC
BH CV ECHO MEAS - MV E MAX VEL: 96.9 CM/SEC
BH CV ECHO MEAS - MV E/A: 1.1
BH CV ECHO MEAS - MV MAX PG: 5.4 MMHG
BH CV ECHO MEAS - MV MEAN PG: 1.7 MMHG
BH CV ECHO MEAS - MV V2 MAX: 115.8 CM/SEC
BH CV ECHO MEAS - MV V2 MEAN: 56.9 CM/SEC
BH CV ECHO MEAS - MV V2 VTI: 35 CM
BH CV ECHO MEAS - MVA(VTI): 4 CM^2
BH CV ECHO MEAS - PA ACC TIME: 0.07 SEC
BH CV ECHO MEAS - PA MAX PG (FULL): 1.1 MMHG
BH CV ECHO MEAS - PA MAX PG: 3.8 MMHG
BH CV ECHO MEAS - PA MEAN PG (FULL): 0.84 MMHG
BH CV ECHO MEAS - PA MEAN PG: 2.1 MMHG
BH CV ECHO MEAS - PA PR(ACCEL): 49.4 MMHG
BH CV ECHO MEAS - PA V2 MAX: 97.2 CM/SEC
BH CV ECHO MEAS - PA V2 MEAN: 68.1 CM/SEC
BH CV ECHO MEAS - PA V2 VTI: 25.4 CM
BH CV ECHO MEAS - PVA(I,A): 3.5 CM^2
BH CV ECHO MEAS - PVA(I,D): 3.5 CM^2
BH CV ECHO MEAS - PVA(V,A): 3.7 CM^2
BH CV ECHO MEAS - PVA(V,D): 3.7 CM^2
BH CV ECHO MEAS - QP/QS: 0.63
BH CV ECHO MEAS - RAP SYSTOLE: 3 MMHG
BH CV ECHO MEAS - RV MAX PG: 2.7 MMHG
BH CV ECHO MEAS - RV MEAN PG: 1.2 MMHG
BH CV ECHO MEAS - RV V1 MAX: 82.4 CM/SEC
BH CV ECHO MEAS - RV V1 MEAN: 51.4 CM/SEC
BH CV ECHO MEAS - RV V1 VTI: 20.1 CM
BH CV ECHO MEAS - RVDD: 3.7 CM
BH CV ECHO MEAS - RVOT AREA: 4.4 CM^2
BH CV ECHO MEAS - RVOT DIAM: 2.4 CM
BH CV ECHO MEAS - RVSP: 20.8 MMHG
BH CV ECHO MEAS - SI(AO): 144 ML/M^2
BH CV ECHO MEAS - SI(CUBED): 34.9 ML/M^2
BH CV ECHO MEAS - SI(LVOT): 57.7 ML/M^2
BH CV ECHO MEAS - SI(MOD-SP4): 33.9 ML/M^2
BH CV ECHO MEAS - SI(TEICH): 30.2 ML/M^2
BH CV ECHO MEAS - SV(AO): 350.2 ML
BH CV ECHO MEAS - SV(CUBED): 84.9 ML
BH CV ECHO MEAS - SV(LVOT): 140.2 ML
BH CV ECHO MEAS - SV(MOD-SP4): 82.4 ML
BH CV ECHO MEAS - SV(RVOT): 87.8 ML
BH CV ECHO MEAS - SV(TEICH): 73.5 ML
BH CV ECHO MEAS - TR MAX VEL: 205.6 CM/SEC
LV EF 2D ECHO EST: 65 %

## 2021-06-10 ENCOUNTER — HOSPITAL ENCOUNTER (OUTPATIENT)
Dept: CT IMAGING | Facility: HOSPITAL | Age: 71
Discharge: HOME | End: 2021-06-10
Admitting: INTERNAL MEDICINE

## 2021-06-10 DIAGNOSIS — I77.9 AORTIC DISEASE (HCC): ICD-10-CM

## 2021-06-10 LAB — CREAT BLDA-MCNC: 0.9 MG/DL (ref 0.6–1.3)

## 2021-06-10 PROCEDURE — 82565 ASSAY OF CREATININE: CPT

## 2021-06-10 PROCEDURE — 71275 CT ANGIOGRAPHY CHEST: CPT

## 2021-06-10 PROCEDURE — 0 IOPAMIDOL PER 1 ML: Performed by: INTERNAL MEDICINE

## 2021-06-10 RX ADMIN — IOPAMIDOL 100 ML: 755 INJECTION, SOLUTION INTRAVENOUS at 10:28

## 2021-07-15 ENCOUNTER — TELEPHONE (OUTPATIENT)
Dept: CARDIOLOGY | Facility: CLINIC | Age: 71
End: 2021-07-15

## 2021-07-15 DIAGNOSIS — I71.21 ASCENDING AORTIC ANEURYSM (HCC): Primary | ICD-10-CM

## 2021-07-15 NOTE — TELEPHONE ENCOUNTER
----- Message from Adán Harding DO sent at 7/15/2021 12:38 PM EDT -----  Regarding: FW: Referral to Dr. Sánchez's office    ----- Message -----  From: Celia Khalil MA  Sent: 7/7/2021  10:17 AM EDT  To: Adán Harding DO  Subject: Referral to Dr. Sánchez's office                   Hey when you get a chance can you please put in a referral for Nikolay Lam to see Dr. Sánchez for the asending aortic aneurysm.     Thank you!

## 2021-07-15 NOTE — TELEPHONE ENCOUNTER
Spoke with pt wife she is aware referral has been sent to Dr Sánchez, and Pt will receive call with appt details. She states she will wait to here from us.

## 2021-08-12 ENCOUNTER — OFFICE VISIT (OUTPATIENT)
Dept: CARDIAC SURGERY | Facility: CLINIC | Age: 71
End: 2021-08-12

## 2021-08-12 VITALS
TEMPERATURE: 97.3 F | SYSTOLIC BLOOD PRESSURE: 129 MMHG | WEIGHT: 265 LBS | BODY MASS INDEX: 35.89 KG/M2 | RESPIRATION RATE: 20 BRPM | HEART RATE: 63 BPM | HEIGHT: 72 IN | OXYGEN SATURATION: 97 % | DIASTOLIC BLOOD PRESSURE: 65 MMHG

## 2021-08-12 DIAGNOSIS — I10 ESSENTIAL HYPERTENSION: Primary | ICD-10-CM

## 2021-08-12 DIAGNOSIS — I25.10 CHRONIC CORONARY ARTERY DISEASE: ICD-10-CM

## 2021-08-12 DIAGNOSIS — E78.5 DYSLIPIDEMIA: ICD-10-CM

## 2021-08-12 DIAGNOSIS — I10 ESSENTIAL HYPERTENSION: ICD-10-CM

## 2021-08-12 DIAGNOSIS — I48.0 PAROXYSMAL ATRIAL FIBRILLATION (HCC): Primary | ICD-10-CM

## 2021-08-12 DIAGNOSIS — D68.9 COAGULOPATHY (HCC): ICD-10-CM

## 2021-08-12 DIAGNOSIS — E66.9 CLASS 2 OBESITY WITHOUT SERIOUS COMORBIDITY WITH BODY MASS INDEX (BMI) OF 35.0 TO 35.9 IN ADULT, UNSPECIFIED OBESITY TYPE: ICD-10-CM

## 2021-08-12 DIAGNOSIS — I71.21 ASCENDING AORTIC ANEURYSM (HCC): ICD-10-CM

## 2021-08-12 PROCEDURE — 99204 OFFICE O/P NEW MOD 45 MIN: CPT | Performed by: THORACIC SURGERY (CARDIOTHORACIC VASCULAR SURGERY)

## 2021-08-12 RX ORDER — AZILSARTAN KAMEDOXOMIL AND CHLORTHALIDONE 40; 12.5 MG/1; MG/1
1 TABLET ORAL DAILY
Qty: 90 TABLET | Refills: 3 | Status: SHIPPED | OUTPATIENT
Start: 2021-08-12 | End: 2022-08-10

## 2021-08-12 RX ORDER — ATORVASTATIN CALCIUM 20 MG/1
20 TABLET, FILM COATED ORAL DAILY
Qty: 90 TABLET | Refills: 2 | Status: SHIPPED | OUTPATIENT
Start: 2021-08-12 | End: 2022-05-09

## 2021-08-12 RX ORDER — OXYCODONE HYDROCHLORIDE AND ACETAMINOPHEN 5; 325 MG/1; MG/1
TABLET ORAL
COMMUNITY
Start: 2021-07-23 | End: 2021-08-12

## 2021-08-12 RX ORDER — METOPROLOL SUCCINATE 50 MG/1
50 TABLET, EXTENDED RELEASE ORAL DAILY
Qty: 90 TABLET | Refills: 2 | Status: SHIPPED | OUTPATIENT
Start: 2021-08-12 | End: 2022-05-09

## 2021-08-12 RX ORDER — CLOPIDOGREL BISULFATE 75 MG/1
75 TABLET ORAL DAILY
Qty: 90 TABLET | Refills: 2 | Status: SHIPPED | OUTPATIENT
Start: 2021-08-12 | End: 2021-11-17 | Stop reason: SDUPTHER

## 2021-08-14 PROBLEM — I71.21 ASCENDING AORTIC ANEURYSM (HCC): Status: ACTIVE | Noted: 2021-08-14

## 2021-08-14 NOTE — PROGRESS NOTES
8/14/2021    Seen on 8/12/2021     Reason for consultation:   Evaluation of ascending aortic aneurysm    Chief Complaint   Same  History of Present Illness:       Dear Dr. Harding, Adán POE* and Colleagues,  It was nice to see Nikolay Lam in consultation at your request. He is a 70 y.o. male with a history of cardiomyopathy, atrial fibrillation, morbid obesity, status post right knee replacement who was found to have a 5.1 cm ascending aortic aneurysm.  He was noted to have some aneurysmal dilatation this was a follow-up CT scan.  The chest CT showed the aorta has a maximum diameter in the root of 3.7 cm, 4.1 cm saccular junction, 5.1 cm ascending aorta and 3.2 cm in the transverse aorta with 2.6 in the distal descending thoracic aorta there is no dissection or ulceration.  He has a family history of an aneurysm with a 4.5 cm aortic aneurysm. He denies syncope, TIA, orthopnea, PND or lower extremity edema.  He had a 2D echocardiogram that showed an ejection fraction of 66% with normal right and left ventricular cavities, and function.  The aortic valve appears trileaflet and there is trace aortic regurgitation and no stenosis.  Mitral valve ventricular have normal function.  He denies a history of aneurysms, dissections or connective tissue disorders    Patient Active Problem List   Diagnosis   • Atrial fibrillation (CMS/HCC)   • Chronic coronary artery disease   • Coagulopathy (CMS/HCC)   • Dyslipidemia   • Hypertension   • Hypertensive cardiovascular disease   • Obesity   • Ear pain, right   • Branch retinal vein occlusion with macular edema   • Combined form of senile cataract   • Elevated bilirubin   • Epiretinal membrane   • Vitreous degeneration   • Erectile dysfunction   • Right knee pain   • Ascending aortic aneurysm (CMS/HCC)       Past Medical History:   Diagnosis Date   • Atrial fibrillation (CMS/HCC)    • Coagulopathy (CMS/HCC)    • Coronary artery disease    • Dyslipidemia    • Hypertension     • Hypertensive cardiovascular disease    • Obesity        Past Surgical History:   Procedure Laterality Date   • CARDIAC CATHETERIZATION     • CARDIOVERSION  12/2015   • CORONARY ANGIOPLASTY WITH STENT PLACEMENT  12/18/2015    Xience Alpine stent to ostial RCA- [facility]   • KNEE SURGERY Left 01/04/2012   • SHOULDER SURGERY Left        No Known Allergies      Current Outpatient Medications:   •  aspirin 325 MG tablet, ASPIRIN 325 MG TABS, Disp: , Rfl:   •  Omega-3 Fatty Acids (fish oil) 1000 MG capsule capsule, FISH OIL 1000 MG CAPS, Disp: , Rfl:   •  atorvastatin (LIPITOR) 20 MG tablet, Take 1 tablet by mouth Daily., Disp: 90 tablet, Rfl: 2  •  clopidogrel (PLAVIX) 75 MG tablet, Take 1 tablet by mouth Daily., Disp: 90 tablet, Rfl: 2  •  Edarbyclor 40-12.5 MG tablet, Take 1 tablet by mouth Daily., Disp: 90 tablet, Rfl: 3  •  metoprolol succinate XL (TOPROL-XL) 50 MG 24 hr tablet, Take 1 tablet by mouth Daily., Disp: 90 tablet, Rfl: 2    Social History     Socioeconomic History   • Marital status:      Spouse name: Not on file   • Number of children: Not on file   • Years of education: Not on file   • Highest education level: Not on file   Tobacco Use   • Smoking status: Former Smoker   • Smokeless tobacco: Never Used   Vaping Use   • Vaping Use: Never used   Substance and Sexual Activity   • Alcohol use: Yes   • Drug use: No       Family History   Problem Relation Age of Onset   • Colon cancer Mother    • Parkinsonism Father      Review of Systems   Constitutional: Positive for fatigue.   Respiratory: Negative for chest tightness and shortness of breath.    Cardiovascular: Positive for palpitations. Negative for chest pain.   Genitourinary: Negative for flank pain.   Neurological: Negative for weakness.       Physical Exam:    Vital Signs:  Weight: 120 kg (265 lb)   Body mass index is 35.94 kg/m².  Temp: 97.3 °F (36.3 °C)   Heart Rate: 63   BP: 129/65     Constitutional:       Appearance:  Well-developed.   Eyes:      Conjunctiva/sclera: Conjunctivae normal.      Pupils: Pupils are equal, round, and reactive to light.   HENT:      Head: Normocephalic and atraumatic.      Nose: Nose normal.   Neck:      Thyroid: No thyromegaly.      Vascular: No JVD.      Lymphadenopathy: No cervical adenopathy.   Pulmonary:      Effort: Pulmonary effort is normal.      Breath sounds: Normal breath sounds. No rales.   Cardiovascular:      PMI at left midclavicular line. Normal rate. Regular rhythm.      Murmurs: There is no murmur.      No gallop. No click. No rub.   Pulses:     Intact distal pulses. No decreased pulses.   Edema:     Peripheral edema absent.   Abdominal:      General: Bowel sounds are normal. There is no distension.      Palpations: Abdomen is soft. There is no abdominal mass.      Tenderness: There is no abdominal tenderness.   Musculoskeletal: Normal range of motion.         General: No tenderness or deformity.      Cervical back: Normal range of motion and neck supple. Skin:     General: Skin is warm and dry.      Findings: No erythema or rash.   Neurological:      Mental Status: Alert and oriented to person, place, and time.      Deep Tendon Reflexes: Reflexes are normal and symmetric.   Psychiatric:         Behavior: Behavior normal.         Relevant studies (other than HPI)        Assessment:     Problems Addressed this Visit        Cardiac and Vasculature    Atrial fibrillation (CMS/HCC) - Primary    Chronic coronary artery disease    Hypertension    Ascending aortic aneurysm (CMS/HCC)       Coag and Thromboembolic    Coagulopathy (CMS/HCC)       Endocrine and Metabolic    Obesity      Diagnoses       Codes Comments    Paroxysmal atrial fibrillation (CMS/HCC)    -  Primary ICD-10-CM: I48.0  ICD-9-CM: 427.31     Chronic coronary artery disease     ICD-10-CM: I25.10  ICD-9-CM: 414.00     Essential hypertension     ICD-10-CM: I10  ICD-9-CM: 401.9     Coagulopathy (CMS/HCC)     ICD-10-CM:  D68.9  ICD-9-CM: 286.9     Class 2 obesity without serious comorbidity with body mass index (BMI) of 35.0 to 35.9 in adult, unspecified obesity type     ICD-10-CM: E66.9, Z68.35  ICD-9-CM: 278.00, V85.35     Ascending aortic aneurysm (CMS/[facility])     ICD-10-CM: I71.2  ICD-9-CM: 441.2         Recommendation/Plan:     1. 5.1 cm ascending aortic aneurysm without dissection or ulceration.  Patient is asymptomatic.  By his size and height, reactive the size of the aneurysm is in the low balance for the operation.  Especially without a bicuspid aortic valve.  I recommend longitudinal follow-up with a chest CT yearly our thoracic aortic surgical clinic.  He has family history of an aneurysm without complication.  He was educated about the natural history disease and he understand chest pain develops should consult immediately in the emergency room for further evaluation and CT scan.  2. He has severe obesity and discussed with him the importance of diet control and weight loss.  He will try to improve his diet.  3. Hypertension, is well controlled systolic blood pressure is 129/65.  He is on metoprolol with good response  4. Atrial fibrillation, rate control.  He is anticoagulated.  Continue same regimen    Thank you for allowing me to participate in his care.    Regards,    Blaine Sánchez M.D.  I spent approximately 45 minutes in reviewing the records, examining the patient, reviewing and interpreting the CT scan, and echocardiogram myself and discussing the findings and options of treatment and plan of follow-up with the patient.  I counseled the patient about weight loss and I spent time also communicating with the care team and documenting in the electronic record

## 2021-09-17 ENCOUNTER — TELEPHONE (OUTPATIENT)
Dept: CARDIOLOGY | Facility: CLINIC | Age: 71
End: 2021-09-17

## 2021-09-17 NOTE — TELEPHONE ENCOUNTER
Patient called and stated he has been back in a-fib.His heart rate has been irregular and his wife states he is also short of breath. His follow up is 11/11/21, and he wants to know if he should come in sooner?    Please Advise.

## 2021-09-20 NOTE — TELEPHONE ENCOUNTER
Called patient to informed to see Jolanta. He has already undergone cardioversion in the past. Maybe he needs some advanced rhythm management. Patient verbalized understanding and will make an appointment with Dr. Stover.

## 2021-09-27 ENCOUNTER — OFFICE VISIT (OUTPATIENT)
Dept: CARDIOLOGY | Facility: CLINIC | Age: 71
End: 2021-09-27

## 2021-09-27 VITALS
BODY MASS INDEX: 35.26 KG/M2 | WEIGHT: 260 LBS | OXYGEN SATURATION: 94 % | DIASTOLIC BLOOD PRESSURE: 80 MMHG | HEART RATE: 61 BPM | SYSTOLIC BLOOD PRESSURE: 163 MMHG

## 2021-09-27 DIAGNOSIS — I11.9 HYPERTENSIVE HEART DISEASE WITHOUT HEART FAILURE: ICD-10-CM

## 2021-09-27 DIAGNOSIS — I71.21 ASCENDING AORTIC ANEURYSM (HCC): ICD-10-CM

## 2021-09-27 DIAGNOSIS — I10 ESSENTIAL HYPERTENSION: ICD-10-CM

## 2021-09-27 DIAGNOSIS — I25.10 CHRONIC CORONARY ARTERY DISEASE: ICD-10-CM

## 2021-09-27 DIAGNOSIS — I48.0 PAROXYSMAL ATRIAL FIBRILLATION (HCC): Primary | ICD-10-CM

## 2021-09-27 PROCEDURE — 93000 ELECTROCARDIOGRAM COMPLETE: CPT | Performed by: INTERNAL MEDICINE

## 2021-09-27 PROCEDURE — 99204 OFFICE O/P NEW MOD 45 MIN: CPT | Performed by: INTERNAL MEDICINE

## 2021-09-27 NOTE — PROGRESS NOTES
CC--Paf, CAD      Sub--This is a 70 y.o. male with a history of ischemic heart disease and paroxysmal atrial fibrillation. He has additional history of dyslipidemia and hypertension. He denies any chest pain pressure heaviness or tightness.  He denies any shortness of breath.  He denies any PND orthopnea.  He denies any syncope or near-syncope.  He reports feeling quite well from a cardiac perspective. Past history of Coronary artery disease status post PCI of the ostial right Coronary artery with a drug-eluting stent in December 2015. PMH of Persistent AF with elevated Dell score. Now status post DC cardioversion years ago and  maintaining sinus rhythm.  Additional history of Hypertension ,Obesity, Dyslipidemia.  Had a vasovagal syncope after teeth removal  Had PAC.s recently        Past Medical History:   Diagnosis Date   • Atrial fibrillation (CMS/HCC)    • Coagulopathy (CMS/HCC)    • Coronary artery disease    • Dyslipidemia    • Hypertension    • Hypertensive cardiovascular disease    • Obesity      Past Surgical History:   Procedure Laterality Date   • CARDIAC CATHETERIZATION     • CARDIOVERSION  12/2015   • CORONARY ANGIOPLASTY WITH STENT PLACEMENT  12/18/2015    Xience Alpine stent to ostial RCA- [facility]   • KNEE SURGERY Left 01/04/2012   • SHOULDER SURGERY Left        Review of Systems   General:  positive for fatigue and tiredness  Eyes: No redness  Cardiovascular: No chest pain, + for palpitations              Physical Exam  VITALS REVIEWED    General:      well developed, well nourished, in no acute distress.    Head:      normocephalic and atraumatic.    Eyes:      PERRL/EOM intact, conjunctiva and sclera clear with out nystagmus.    Neck:      no masses, thyromegaly,  trachea central with normal respiratory effort and PMI displaced laterally  Lungs:      clear bilaterally to auscultation.    Heart:       Sinus rhythm without murmurs or gallops      Prior CV noted , echo and stress test  reviewed      A/P      PAF WITHOUT RECURRENCE SINCE 2015  CAD  HTN  RECENT VASOVAGAL SYNCOPE AFTER TEETH REMOVAL  SX OF PAC.S  HLD  FOLLOW UP IN 6 MONTHS  AAA--CT FOLLOWING        ECG 12 Lead    Date/Time: 9/27/2021 3:09 PM  Performed by: Albin Stover MD  Authorized by: Albin Stover MD   Comparison: compared with previous ECG   Similar to previous ECG  Rhythm: sinus rhythm  Ectopy: atrial premature contractions  Rate: normal  QRS axis: normal            Electronically signed by Albin Stover MD, 09/27/21, 3:09 PM EDT.

## 2021-11-11 ENCOUNTER — OFFICE VISIT (OUTPATIENT)
Dept: CARDIOLOGY | Facility: CLINIC | Age: 71
End: 2021-11-11

## 2021-11-11 VITALS
OXYGEN SATURATION: 98 % | HEIGHT: 72 IN | HEART RATE: 83 BPM | BODY MASS INDEX: 35.49 KG/M2 | DIASTOLIC BLOOD PRESSURE: 77 MMHG | SYSTOLIC BLOOD PRESSURE: 160 MMHG | WEIGHT: 262 LBS

## 2021-11-11 DIAGNOSIS — I71.21 ASCENDING AORTIC ANEURYSM (HCC): ICD-10-CM

## 2021-11-11 DIAGNOSIS — I11.9 HYPERTENSIVE HEART DISEASE WITHOUT HEART FAILURE: ICD-10-CM

## 2021-11-11 DIAGNOSIS — I48.0 PAROXYSMAL ATRIAL FIBRILLATION (HCC): ICD-10-CM

## 2021-11-11 DIAGNOSIS — I25.10 CHRONIC CORONARY ARTERY DISEASE: Primary | ICD-10-CM

## 2021-11-11 PROCEDURE — 99214 OFFICE O/P EST MOD 30 MIN: CPT | Performed by: INTERNAL MEDICINE

## 2021-11-11 RX ORDER — SILDENAFIL 25 MG/1
25 TABLET, FILM COATED ORAL DAILY PRN
Qty: 20 TABLET | Refills: 3 | Status: SHIPPED | OUTPATIENT
Start: 2021-11-11

## 2021-11-11 NOTE — PATIENT INSTRUCTIONS
Follow-up 6 months  CT angio chest in December  Follow-up with Dr Sánchez as directed  Please get labs from [facility] office

## 2021-11-11 NOTE — PROGRESS NOTES
Cardiology Office Visit      Encounter Date:  11/11/2021    Patient ID:   Nikolay Lam is a 71 y.o. male.    Reason For Followup:  Coronary artery disease  Paroxysmal atrial fibrillation    Brief Clinical History:  Dear Josep Edwards MD    I had the pleasure of seeing Nikolay Lam today. As you are well aware, this is a 71 y.o. male with a history of ischemic heart disease and proximal atrial fibrillation. He has additional history of dyslipidemia and hypertension. He presents today for follow-up on the above conditions.     Interval History:  He denies any chest pain pressure heaviness or tightness.  He denies any shortness of breath.  He denies any PND orthopnea.  He denies any syncope or near-syncope.  He reports feeling quite well from a cardiac perspective.    He did have an episode of A. fib after a dental extraction.  It was very short-lived.  He followed up with cardiac electrophysiology however no interventions were recommended.    His blood pressure is elevated today however at home his blood pressure is lower.  This morning it was 133 systolic at home.    He did undergo right knee surgery.  It was complicated with a blood clot in his knee that necessitated reopening the knee however after that he did quite well.  He reports feeling much better from an orthopedic standpoint.    He also is reporting some difficulty with occasional erectile dysfunction.  We reviewed his meds and his clinical conditions.  I see no contraindication for phosphodiesterase inhibitors.  He will start sildenafil.    Additionally he is very concerned about his ascending aortic aneurysm.  He was seen by CT surgery this past summer who recommended annual follow-up but he is concerned that the aneurysm may be enlarging.  We discussed options and will get a repeat CT angio of his chest to evaluate for any increased enlargement.     Assessment & Plan     Impressions:  Coronary artery disease status post PCI of the ostial  "right Coronary artery with a drug-eluting stent in December 2015.  Paroxysmal AF with elevated Dell score. Now status post DC cardioversion. Maintaining sinus rhythm.  Coagulopathy secondary to  Eliquis  Hypertension with HTCV. Suboptimal today but was normal at PCP last week  Obesity.  Dyslipidemia.  Hypertension with hypertensive cardiovascular disease. - suboptimal, situational component noted.  Erectile dysfunction     Recommendations:  Sildenafil 25 mg as needed  CT angio of the chest to evaluate ascending aortic aneurysm  Follow-up with Dr. Sánchez in aortic clinic as directed  Obtain labs from your office  Follow-up in 6 months time sooner should there be difficulties.    Objective:    Vitals:  Vitals:    11/11/21 1025   BP: 160/77   BP Location: Left arm   Patient Position: Sitting   Cuff Size: Adult   Pulse: 83   SpO2: 98%   Weight: 119 kg (262 lb)   Height: 182.9 cm (72\")       Physical Exam:    General: Alert, cooperative, no distress, appears stated age  Head:  Normocephalic, atraumatic, mucous membranes moist  Eyes:  Conjunctiva/corneas clear, EOM's intact     Neck:  Supple,  no bruit  Lungs: Clear to auscultation bilaterally, no wheezes rhonchi rales are noted  Chest wall: No tenderness  Heart::  Regular rate and rhythm, S1 and S2 normal, 1/6 holosystolic murmur.  No rub or gallop  Abdomen: Soft, non-tender, nondistended bowel sounds active  Extremities: No cyanosis, clubbing, or edema  Pulses: 2+ and symmetric all extremities  Skin:  No rashes or lesions  Neuro/psych: A&O x3. CN II through XII are grossly intact with appropriate affect      Allergies:  No Known Allergies    Medication Review:     Current Outpatient Medications:   •  aspirin 325 MG tablet, ASPIRIN 325 MG TABS, Disp: , Rfl:   •  atorvastatin (LIPITOR) 20 MG tablet, Take 1 tablet by mouth Daily., Disp: 90 tablet, Rfl: 2  •  clopidogrel (PLAVIX) 75 MG tablet, Take 1 tablet by mouth Daily., Disp: 90 tablet, Rfl: 2  •  Edarbyclor 40-12.5 MG " tablet, Take 1 tablet by mouth Daily., Disp: 90 tablet, Rfl: 3  •  metoprolol succinate XL (TOPROL-XL) 50 MG 24 hr tablet, Take 1 tablet by mouth Daily., Disp: 90 tablet, Rfl: 2  •  Omega-3 Fatty Acids (fish oil) 1000 MG capsule capsule, FISH OIL 1000 MG CAPS, Disp: , Rfl:     Family History:  Family History   Problem Relation Age of Onset   • Colon cancer Mother    • Parkinsonism Father        Past Medical History:  Past Medical History:   Diagnosis Date   • Atrial fibrillation (HCC)    • Coagulopathy (HCC)    • Coronary artery disease    • Dyslipidemia    • Hypertension    • Hypertensive cardiovascular disease    • Obesity        Past surgical History:  Past Surgical History:   Procedure Laterality Date   • CARDIAC CATHETERIZATION     • CARDIOVERSION  12/2015   • CORONARY ANGIOPLASTY WITH STENT PLACEMENT  12/18/2015    Xience Alpine stent to ostial RCA- [facility]   • KNEE SURGERY Left 01/04/2012   • SHOULDER SURGERY Left        Social History:  Social History     Socioeconomic History   • Marital status:    Tobacco Use   • Smoking status: Former Smoker   • Smokeless tobacco: Never Used   Vaping Use   • Vaping Use: Never used   Substance and Sexual Activity   • Alcohol use: Yes   • Drug use: No       Review of Systems:  The following systems were reviewed as they relate to the cardiovascular system: Constitutional, Eyes, ENT, Cardiovascular, Respiratory, Gastrointestinal, Integumentary, Neurological, Psychiatric, Hematologic, Endocrine, Musculoskeletal, and Genitourinary. The pertinent cardiovascular findings are reported above with all other cardiovascular points within those systems being negative.    Diagnostic Study Review:     Current Electrocardiogram:  Procedures no new EKG.  EKG dated 9/27/2021 demonstrates sinus rhythm with a ventricular rate of 61 bpm.      NOTE: The following portions of the patient's note were reviewed, confirmed and/or updated this visit as appropriate: History of  present illness/Interval history, physical examination, assessment & plan, allergies, current medications, past family history, past medical history, past social history, past surgical history and problem list.

## 2021-11-17 DIAGNOSIS — I25.10 CHRONIC CORONARY ARTERY DISEASE: ICD-10-CM

## 2021-11-17 RX ORDER — CLOPIDOGREL BISULFATE 75 MG/1
75 TABLET ORAL DAILY
Qty: 90 TABLET | Refills: 2 | Status: SHIPPED | OUTPATIENT
Start: 2021-11-17 | End: 2022-05-09 | Stop reason: SDUPTHER

## 2021-12-09 ENCOUNTER — HOSPITAL ENCOUNTER (OUTPATIENT)
Dept: CT IMAGING | Facility: HOSPITAL | Age: 71
Discharge: HOME | End: 2021-12-09
Admitting: INTERNAL MEDICINE

## 2021-12-09 DIAGNOSIS — I71.21 ASCENDING AORTIC ANEURYSM (HCC): ICD-10-CM

## 2021-12-09 LAB — CREAT BLDA-MCNC: 0.9 MG/DL (ref 0.6–1.3)

## 2021-12-09 PROCEDURE — 71275 CT ANGIOGRAPHY CHEST: CPT

## 2021-12-09 PROCEDURE — 0 IOPAMIDOL PER 1 ML: Performed by: INTERNAL MEDICINE

## 2021-12-09 PROCEDURE — 82565 ASSAY OF CREATININE: CPT

## 2021-12-09 RX ADMIN — IOPAMIDOL 100 ML: 755 INJECTION, SOLUTION INTRAVENOUS at 07:54

## 2022-05-09 DIAGNOSIS — I25.10 CHRONIC CORONARY ARTERY DISEASE: ICD-10-CM

## 2022-05-09 DIAGNOSIS — I10 ESSENTIAL HYPERTENSION: ICD-10-CM

## 2022-05-09 DIAGNOSIS — E78.5 DYSLIPIDEMIA: ICD-10-CM

## 2022-05-09 RX ORDER — METOPROLOL SUCCINATE 50 MG/1
TABLET, EXTENDED RELEASE ORAL
Qty: 90 TABLET | Refills: 0 | Status: SHIPPED | OUTPATIENT
Start: 2022-05-09 | End: 2022-05-12 | Stop reason: SDUPTHER

## 2022-05-09 RX ORDER — ATORVASTATIN CALCIUM 20 MG/1
TABLET, FILM COATED ORAL
Qty: 90 TABLET | Refills: 0 | Status: SHIPPED | OUTPATIENT
Start: 2022-05-09 | End: 2022-05-12 | Stop reason: SDUPTHER

## 2022-05-09 RX ORDER — CLOPIDOGREL BISULFATE 75 MG/1
75 TABLET ORAL DAILY
Qty: 90 TABLET | Refills: 2 | Status: SHIPPED | OUTPATIENT
Start: 2022-05-09 | End: 2022-05-12 | Stop reason: SDUPTHER

## 2022-05-12 ENCOUNTER — OFFICE VISIT (OUTPATIENT)
Dept: CARDIOLOGY | Facility: CLINIC | Age: 72
End: 2022-05-12

## 2022-05-12 VITALS
HEART RATE: 68 BPM | DIASTOLIC BLOOD PRESSURE: 68 MMHG | SYSTOLIC BLOOD PRESSURE: 130 MMHG | BODY MASS INDEX: 34.67 KG/M2 | WEIGHT: 256 LBS | HEIGHT: 72 IN | OXYGEN SATURATION: 97 %

## 2022-05-12 DIAGNOSIS — I48.0 PAROXYSMAL ATRIAL FIBRILLATION: ICD-10-CM

## 2022-05-12 DIAGNOSIS — I10 PRIMARY HYPERTENSION: ICD-10-CM

## 2022-05-12 DIAGNOSIS — I71.21 ASCENDING AORTIC ANEURYSM: ICD-10-CM

## 2022-05-12 DIAGNOSIS — I10 ESSENTIAL HYPERTENSION: ICD-10-CM

## 2022-05-12 DIAGNOSIS — I11.9 HYPERTENSIVE HEART DISEASE WITHOUT HEART FAILURE: ICD-10-CM

## 2022-05-12 DIAGNOSIS — I25.10 CHRONIC CORONARY ARTERY DISEASE: Primary | ICD-10-CM

## 2022-05-12 DIAGNOSIS — E78.5 DYSLIPIDEMIA: ICD-10-CM

## 2022-05-12 PROCEDURE — 93000 ELECTROCARDIOGRAM COMPLETE: CPT | Performed by: INTERNAL MEDICINE

## 2022-05-12 PROCEDURE — 99214 OFFICE O/P EST MOD 30 MIN: CPT | Performed by: INTERNAL MEDICINE

## 2022-05-12 RX ORDER — ATORVASTATIN CALCIUM 20 MG/1
20 TABLET, FILM COATED ORAL DAILY
Qty: 90 TABLET | Refills: 3 | Status: SHIPPED | OUTPATIENT
Start: 2022-05-12

## 2022-05-12 RX ORDER — METOPROLOL SUCCINATE 50 MG/1
50 TABLET, EXTENDED RELEASE ORAL DAILY
Qty: 90 TABLET | Refills: 3 | Status: SHIPPED | OUTPATIENT
Start: 2022-05-12

## 2022-05-12 RX ORDER — CLOPIDOGREL BISULFATE 75 MG/1
75 TABLET ORAL DAILY
Qty: 90 TABLET | Refills: 3 | Status: SHIPPED | OUTPATIENT
Start: 2022-05-12

## 2022-05-12 NOTE — PATIENT INSTRUCTIONS
Get labs from [facility] office  Follow-up with CT surgery in August/priti  Follow-up 6 months with Meaghan

## 2022-05-12 NOTE — PROGRESS NOTES
Cardiology Office Visit      Encounter Date:  05/12/2022    Patient ID:   Nikolay Lam is a 71 y.o. male.    Reason For Followup:  Coronary artery disease  Paroxysmal atrial fibrillation    Brief Clinical History:  Dear Josep Edwards MD    I had the pleasure of seeing Nikolay Lam today. As you are well aware, this is a 71 y.o. male with a history of ischemic heart disease and proximal atrial fibrillation. He has additional history of dyslipidemia and hypertension. He presents today for follow-up on the above conditions.     Interval History:  He denies any chest pain pressure heaviness or tightness.  He denies any shortness of breath.  He denies any PND orthopnea.  He denies any syncope or near-syncope.  Other than some bruising from his aspirin, he reports feeling quite well from a cardiac perspective.    I did discuss his case with cardiothoracic surgery.  He is scheduled to follow-up later this year with regards to his ascending aortic aneurysm.    He reports that he had labs performed in your office.  We will request these for review.  Specifically, we are looking for CBC, CMP, and lipid profile.    Assessment & Plan     Impressions:  Coronary artery disease status post PCI of the ostial right Coronary artery with a drug-eluting stent in December 2015.  Paroxysmal AF with elevated Dell score. Now status post DC cardioversion. Maintaining sinus rhythm.  Coagulopathy secondary to  Eliquis  Hypertension with hypertensive cardiovascular disease  Ascending aortic aneurysm-5.1 cm CT angiography December 2021  Obesity.  Dyslipidemia.  Hypertension with hypertensive cardiovascular disease. - suboptimal, situational component noted.  Erectile dysfunction     Recommendations:  Continuation of his current cardiovascular regimen at the present time.     This includes antihypertensives, statin, and antiplatelet therapy  Follow-up with CT surgery as scheduled  Follow-up in 6 months time, sooner should there be  "difficulties    Diagnoses and all orders for this visit:    1. Chronic coronary artery disease (Primary)  -     clopidogrel (PLAVIX) 75 MG tablet; Take 1 tablet by mouth Daily.  Dispense: 90 tablet; Refill: 3  -     ECG 12 Lead    2. Primary hypertension  -     ECG 12 Lead    3. Hypertensive heart disease without heart failure  -     ECG 12 Lead    4. Paroxysmal atrial fibrillation (HCC)  -     ECG 12 Lead    5. Ascending aortic aneurysm (HCC)  -     ECG 12 Lead    6. Dyslipidemia  -     atorvastatin (LIPITOR) 20 MG tablet; Take 1 tablet by mouth Daily.  Dispense: 90 tablet; Refill: 3  -     ECG 12 Lead    7. Essential hypertension  -     metoprolol succinate XL (TOPROL-XL) 50 MG 24 hr tablet; Take 1 tablet by mouth Daily.  Dispense: 90 tablet; Refill: 3  -     ECG 12 Lead          Objective:    Vitals:  Vitals:    05/12/22 1421   BP: 130/68   Pulse: 68   SpO2: 97%   Weight: 116 kg (256 lb)   Height: 182.9 cm (72\")     Body mass index is 34.72 kg/m².      Physical Exam:    General: Alert, cooperative, no distress, appears stated age  Head:  Normocephalic, atraumatic, mucous membranes moist  Eyes:  Conjunctiva/corneas clear, EOM's intact     Neck:  Supple,  no bruit    Lungs: Clear to auscultation bilaterally, no wheezes rhonchi rales are noted  Chest wall: No tenderness  Heart::  Regular rate and rhythm, S1 and S2 normal, 1/6 holosystolic murmur.  No rub or gallop  Abdomen: Soft, non-tender, nondistended bowel sounds active  Extremities: No cyanosis, clubbing, or edema  Pulses: 2+ and symmetric all extremities  Skin:  No rashes or lesions  Neuro/psych: A&O x3. CN II through XII are grossly intact with appropriate affect      Allergies:  No Known Allergies    Medication Review:     Current Outpatient Medications:   •  aspirin 325 MG tablet, ASPIRIN 325 MG TABS, Disp: , Rfl:   •  atorvastatin (LIPITOR) 20 MG tablet, Take 1 tablet by mouth Daily., Disp: 90 tablet, Rfl: 3  •  clopidogrel (PLAVIX) 75 MG tablet, Take 1 " tablet by mouth Daily., Disp: 90 tablet, Rfl: 3  •  Edarbyclor 40-12.5 MG tablet, Take 1 tablet by mouth Daily., Disp: 90 tablet, Rfl: 3  •  metoprolol succinate XL (TOPROL-XL) 50 MG 24 hr tablet, Take 1 tablet by mouth Daily., Disp: 90 tablet, Rfl: 3  •  Omega-3 Fatty Acids (fish oil) 1000 MG capsule capsule, FISH OIL 1000 MG CAPS, Disp: , Rfl:   •  sildenafil (VIAGRA) 25 MG tablet, Take 1 tablet by mouth Daily As Needed for Erectile Dysfunction., Disp: 20 tablet, Rfl: 3    Family History:  Family History   Problem Relation Age of Onset   • Colon cancer Mother    • Parkinsonism Father        Past Medical History:  Past Medical History:   Diagnosis Date   • Atrial fibrillation (HCC)    • Coagulopathy (HCC)    • Coronary artery disease    • Dyslipidemia    • Hyperlipidemia    • Hypertension    • Hypertensive cardiovascular disease    • Obesity        Past Surgical History:  Past Surgical History:   Procedure Laterality Date   • CARDIAC CATHETERIZATION     • CARDIOVERSION  12/2015   • CORONARY ANGIOPLASTY WITH STENT PLACEMENT  12/18/2015    Xience Alpine stent to ostial RCA- [facility]   • KNEE SURGERY Left 01/04/2012   • SHOULDER SURGERY Left        Social History:  Social History     Socioeconomic History   • Marital status:    Tobacco Use   • Smoking status: Former Smoker   • Smokeless tobacco: Never Used   Vaping Use   • Vaping Use: Never used   Substance and Sexual Activity   • Alcohol use: Yes   • Drug use: No       Review of Systems:  The following systems were reviewed as they relate to the cardiovascular system: Constitutional, Eyes, ENT, Cardiovascular, Respiratory, Gastrointestinal, Integumentary, Neurological, Psychiatric, Hematologic, Endocrine, Musculoskeletal, and Genitourinary. The pertinent cardiovascular findings are reported above with all other cardiovascular points within those systems being negative.    Diagnostic Study Review:     Current Electrocardiogram:    ECG 12  Lead    Date/Time: 5/12/2022 5:00 PM  Performed by: Adán Harding DO  Authorized by: Adán Harding DO   Comparison: not compared with previous ECG   Previous ECG: no previous ECG available  Comments: Normal sinus rhythm with a ventricular rate of 61 bpm.  Early R wave transition.  Normal QT and QTc intervals.  Normal QRS axis.            Laboratory Data:  Lab Results   Component Value Date    GLUCOSE 114 (H) 09/20/2019    BUN 19 09/20/2019    CREATININE 0.90 12/09/2021    EGFRIFNONA 74 09/20/2019    BCR 19.0 09/20/2019    K 4.4 09/20/2019    CO2 25.0 09/20/2019    CALCIUM 8.8 (L) 09/20/2019    ALBUMIN 3.80 09/20/2019    AST 21 09/20/2019    ALT 17 09/20/2019     Lab Results   Component Value Date    GLUCOSE 114 (H) 09/20/2019    CALCIUM 8.8 (L) 09/20/2019     09/20/2019    K 4.4 09/20/2019    CO2 25.0 09/20/2019     09/20/2019    BUN 19 09/20/2019    CREATININE 0.90 12/09/2021    EGFRIFNONA 74 09/20/2019    BCR 19.0 09/20/2019    ANIONGAP 12.4 09/20/2019     Lab Results   Component Value Date    WBC 5.60 09/20/2019    HGB 13.2 09/20/2019    HCT 38.3 09/20/2019    MCV 91.5 09/20/2019     09/20/2019     Lab Results   Component Value Date    CHOL 165 09/20/2019    TRIG 46 09/20/2019    HDL 55 09/20/2019     (H) 09/20/2019     No results found for: HGBA1C  No results found for: INR, PROTIME    Most Recent Echo:  Results for orders placed during the hospital encounter of 05/26/21    Adult Transthoracic Echo Complete W/ Cont if Necessary Per Protocol    Interpretation Summary  · Left ventricular wall thickness is consistent with mild concentric hypertrophy.  · Estimated left ventricular EF = 65% Estimated left ventricular EF was in agreement with the calculated left ventricular EF. Left ventricular systolic function is normal.  · Mild dilation of the aortic root is present. Severe dilation of the ascending aorta is present.  · Left ventricular diastolic function is  consistent with (grade I) impaired relaxation.  · Estimated right ventricular systolic pressure from tricuspid regurgitation is normal (<35 mmHg).       Most Recent Stress Test:  Results for orders placed during the hospital encounter of 05/26/21    Stress Test With Myocardial Perfusion One Day    Interpretation Summary  · Myocardial perfusion imaging indicates a normal myocardial perfusion study with no evidence of ischemia.  · Left ventricular ejection fraction is normal. (Calculated EF = 60%).  · Impressions are consistent with a low risk study.       Most Recent Cardiac Catheterization:   No results found for this or any previous visit.       NOTE: The following portions of the patient's note were reviewed, confirmed and/or updated this visit as appropriate: History of present illness/Interval history, physical examination, assessment & plan, allergies, current medications, past family history, past medical history, past social history, past surgical history and problem list.

## 2022-07-21 DIAGNOSIS — I71.21 ASCENDING AORTIC ANEURYSM: Primary | ICD-10-CM

## 2022-08-10 DIAGNOSIS — I10 ESSENTIAL HYPERTENSION: ICD-10-CM

## 2022-08-10 RX ORDER — AZILSARTAN KAMEDOXOMIL AND CHLORTHALIDONE 40; 12.5 MG/1; MG/1
TABLET ORAL
Qty: 90 TABLET | Refills: 0 | Status: SHIPPED | OUTPATIENT
Start: 2022-08-10 | End: 2023-01-25

## 2022-08-11 ENCOUNTER — HOSPITAL ENCOUNTER (OUTPATIENT)
Dept: CT IMAGING | Facility: HOSPITAL | Age: 72
Discharge: HOME | End: 2022-08-11
Admitting: THORACIC SURGERY (CARDIOTHORACIC VASCULAR SURGERY)

## 2022-08-11 DIAGNOSIS — I71.21 ASCENDING AORTIC ANEURYSM: ICD-10-CM

## 2022-08-11 PROCEDURE — 71250 CT THORAX DX C-: CPT

## 2022-08-25 ENCOUNTER — OFFICE VISIT (OUTPATIENT)
Dept: CARDIAC SURGERY | Facility: CLINIC | Age: 72
End: 2022-08-25

## 2022-08-25 VITALS
OXYGEN SATURATION: 96 % | HEART RATE: 62 BPM | BODY MASS INDEX: 34.27 KG/M2 | DIASTOLIC BLOOD PRESSURE: 65 MMHG | TEMPERATURE: 97.1 F | WEIGHT: 253 LBS | RESPIRATION RATE: 20 BRPM | HEIGHT: 72 IN | SYSTOLIC BLOOD PRESSURE: 106 MMHG

## 2022-08-25 DIAGNOSIS — I25.10 CHRONIC CORONARY ARTERY DISEASE: ICD-10-CM

## 2022-08-25 DIAGNOSIS — D68.9 COAGULOPATHY: ICD-10-CM

## 2022-08-25 DIAGNOSIS — I71.21 ASCENDING AORTIC ANEURYSM: ICD-10-CM

## 2022-08-25 DIAGNOSIS — I48.0 PAROXYSMAL ATRIAL FIBRILLATION: Primary | ICD-10-CM

## 2022-08-25 DIAGNOSIS — I10 PRIMARY HYPERTENSION: ICD-10-CM

## 2022-08-25 PROCEDURE — 99213 OFFICE O/P EST LOW 20 MIN: CPT | Performed by: THORACIC SURGERY (CARDIOTHORACIC VASCULAR SURGERY)

## 2022-08-30 NOTE — PROGRESS NOTES
8/30/2022    Seen on 8/25/2022    Chief Complaint:     Follow-up evaluation of ascending aortic aneurysm    History of Present Illness:       Dear Colleagues,  It was nice to see Nikolay Lam in follow up evaluation for his ascending aortic aneurysm. He is a 71 y.o. male with a history of hypertension, hyperlipidemia, coagulopathy, chronic coronary artery disease and atrial fibrillation who is being followed up in our thoracic aortic aneurysm clinic because of his aneurysm.  In my measurements, it was 5.1 cm last year.  In the radiology report was described as 4.9 cm. He denies any new symptoms in the interim. His last chest CT showed his ascending aorta at the same diameter as before with no morphologic changes.  There is no dissection or ulceration.  There is tapering into the aortic arch.  He had an echocardiogram in the past that shows only mild AI.  He has a brother with a thoracic aneurysm.  He is asymptomatic.    Patient Active Problem List   Diagnosis   • Atrial fibrillation (HCC)   • Chronic coronary artery disease   • Coagulopathy (HCC)   • Dyslipidemia   • Hypertension   • Hypertensive cardiovascular disease   • Obesity   • Ear pain, right   • Branch retinal vein occlusion with macular edema   • Combined form of senile cataract   • Elevated bilirubin   • Epiretinal membrane   • Vitreous degeneration   • Erectile dysfunction   • Right knee pain   • Ascending aortic aneurysm (HCC)       Past Medical History:   Diagnosis Date   • Atrial fibrillation (HCC)    • Coagulopathy (HCC)    • Coronary artery disease    • Dyslipidemia    • Hyperlipidemia    • Hypertension    • Hypertensive cardiovascular disease    • Obesity        Past Surgical History:   Procedure Laterality Date   • CARDIAC CATHETERIZATION     • CARDIOVERSION  12/2015   • CORONARY ANGIOPLASTY WITH STENT PLACEMENT  12/18/2015    Xience Alpine stent to ostial RCA- [facility]   • KNEE SURGERY Left 01/04/2012   • SHOULDER SURGERY Left         No Known Allergies      Current Outpatient Medications:   •  aspirin 325 MG tablet, ASPIRIN 325 MG TABS, Disp: , Rfl:   •  atorvastatin (LIPITOR) 20 MG tablet, Take 1 tablet by mouth Daily., Disp: 90 tablet, Rfl: 3  •  clopidogrel (PLAVIX) 75 MG tablet, Take 1 tablet by mouth Daily., Disp: 90 tablet, Rfl: 3  •  Edarbyclor 40-12.5 MG tablet, TAKE 1 TABLET BY MOUTH ONCE DAILY, Disp: 90 tablet, Rfl: 0  •  metoprolol succinate XL (TOPROL-XL) 50 MG 24 hr tablet, Take 1 tablet by mouth Daily., Disp: 90 tablet, Rfl: 3  •  Omega-3 Fatty Acids (fish oil) 1000 MG capsule capsule, FISH OIL 1000 MG CAPS, Disp: , Rfl:   •  sildenafil (VIAGRA) 25 MG tablet, Take 1 tablet by mouth Daily As Needed for Erectile Dysfunction., Disp: 20 tablet, Rfl: 3    Social History     Socioeconomic History   • Marital status:    Tobacco Use   • Smoking status: Former Smoker   • Smokeless tobacco: Never Used   Vaping Use   • Vaping Use: Never used   Substance and Sexual Activity   • Alcohol use: Yes   • Drug use: No       Family History   Problem Relation Age of Onset   • Colon cancer Mother    • Parkinsonism Father      Review of Systems  Noncontributory    Physical Exam:    Vital Signs:  Weight: 115 kg (253 lb)   Body mass index is 34.31 kg/m².  Temp: 97.1 °F (36.2 °C)   Heart Rate: 62   BP: 106/65     Constitutional:       Appearance: Well-developed.   Eyes:      Conjunctiva/sclera: Conjunctivae normal.      Pupils: Pupils are equal, round, and reactive to light.   HENT:      Head: Normocephalic and atraumatic.      Nose: Nose normal.   Neck:      Thyroid: No thyromegaly.      Vascular: No JVD.      Lymphadenopathy: No cervical adenopathy.   Pulmonary:      Effort: Pulmonary effort is normal.      Breath sounds: Normal breath sounds. No rales.   Cardiovascular:      Normal rate. Irregular rhythm.      Murmurs: There is no murmur.      No gallop.   Pulses:     Intact distal pulses. No decreased pulses.   Edema:     Peripheral edema  absent.   Abdominal:      General: Bowel sounds are normal. There is no distension.      Palpations: Abdomen is soft. There is no abdominal mass.      Tenderness: There is no abdominal tenderness.   Musculoskeletal: Normal range of motion.         General: No tenderness or deformity.      Cervical back: Normal range of motion and neck supple. Skin:     General: Skin is warm and dry.      Findings: No erythema or rash.   Neurological:      Mental Status: Alert and oriented to person, place, and time.      Deep Tendon Reflexes: Reflexes are normal and symmetric.   Psychiatric:         Behavior: Behavior normal.          Assessment:     Problems Addressed this Visit        Cardiac and Vasculature    Atrial fibrillation (HCC) - Primary    Chronic coronary artery disease    Hypertension    Ascending aortic aneurysm (HCC)       Coag and Thromboembolic    Coagulopathy (HCC)      Diagnoses       Codes Comments    Paroxysmal atrial fibrillation (HCC)    -  Primary ICD-10-CM: I48.0  ICD-9-CM: 427.31     Chronic coronary artery disease     ICD-10-CM: I25.10  ICD-9-CM: 414.00     Primary hypertension     ICD-10-CM: I10  ICD-9-CM: 401.9     Ascending aortic aneurysm (HCC)     ICD-10-CM: I71.2  ICD-9-CM: 441.2     Coagulopathy (HCC)     ICD-10-CM: D68.9  ICD-9-CM: 286.9           Assessment/recommendation:     1. 5.1 cm ascending aortic aneurysm without dissection or ulceration and is asymptomatic.  Aneurysm stable.  He has no significant aortic valve disease.  Family history for aortic aneurysms.  Recommend longitudinal follow-up with a CTA and echocardiogram in 1 year.  I discussed with the patient at length the natural history disease and the guidelines for aneurysm repair.  Discussed with the patient that if chest pain develops he should seek immediate attention in the emergency room.  He verbalized understanding  2. Hypertension, well controlled.  Continue same regimen    Thank you for allowing me to participate in his  care.    Regards,    Blaine Sánchez M.D.    I spent over 20 minutes in reviewing the records, examining the patient, reviewing and interpreting myself the CT of the chest and discussing the findings and options with the patient, the natural history disease and guidelines for intervention, discussing the importance of maintaining low the DP-DT to prevent aneurysmal expansion and spent time in documenting in the electronic record

## 2022-11-10 ENCOUNTER — OFFICE VISIT (OUTPATIENT)
Dept: CARDIOLOGY | Facility: CLINIC | Age: 72
End: 2022-11-10

## 2022-11-10 VITALS
HEIGHT: 72 IN | DIASTOLIC BLOOD PRESSURE: 85 MMHG | OXYGEN SATURATION: 98 % | SYSTOLIC BLOOD PRESSURE: 140 MMHG | BODY MASS INDEX: 34.67 KG/M2 | WEIGHT: 256 LBS | HEART RATE: 61 BPM

## 2022-11-10 DIAGNOSIS — I10 PRIMARY HYPERTENSION: ICD-10-CM

## 2022-11-10 DIAGNOSIS — E78.5 DYSLIPIDEMIA: ICD-10-CM

## 2022-11-10 DIAGNOSIS — I48.0 PAROXYSMAL ATRIAL FIBRILLATION: Primary | ICD-10-CM

## 2022-11-10 DIAGNOSIS — D68.9 COAGULOPATHY: ICD-10-CM

## 2022-11-10 DIAGNOSIS — I25.10 CHRONIC CORONARY ARTERY DISEASE: ICD-10-CM

## 2022-11-10 DIAGNOSIS — I11.9 HYPERTENSIVE HEART DISEASE WITHOUT HEART FAILURE: ICD-10-CM

## 2022-11-10 PROCEDURE — 93000 ELECTROCARDIOGRAM COMPLETE: CPT | Performed by: INTERNAL MEDICINE

## 2022-11-10 PROCEDURE — 99214 OFFICE O/P EST MOD 30 MIN: CPT | Performed by: INTERNAL MEDICINE

## 2022-11-10 NOTE — PROGRESS NOTES
Cardiology Office Visit      Encounter Date:  11/10/2022    Patient ID:   Nikolay Lam is a 72 y.o. male.    Reason For Followup:  Coronary artery disease  Paroxysmal atrial fibrillation    Brief Clinical History:  Dear Josep Edwards MD    I had the pleasure of seeing Nikolay Lam today. As you are well aware, this is a 72 y.o. male with a history of ischemic heart disease and proximal atrial fibrillation. He has additional history of dyslipidemia and hypertension. He presents today for follow-up on the above conditions.     Interval History:  He denies any chest pain pressure heaviness or tightness.  He denies any shortness of breath.  He denies any PND orthopnea.  He denies any syncope or near-syncope.  He reports feeling well from a cardiac perspective.    He did follow-up with CT surgery regarding his ascending aortic aneurysm.  They will continue with routine follow-ups annually.    He reports that he will be taking a 1 month trip to [place] in the coming weeks.    He reports that he had labs performed in your office.  We will request these for review.  Specifically we are looking for CBC, CMP, and lipid profiles.    Assessment & Plan     Impressions:  Coronary artery disease status post PCI of the ostial right Coronary artery with a drug-eluting stent in December 2015.  Paroxysmal AF with elevated Dell score.      Status post DC cardioversion in the past     Maintaining sinus rhythm without recurrence     Currently on full dose aspirin.  Hypertension with hypertensive cardiovascular disease  Ascending aortic aneurysm-5.1 cm CT angiography December 2021     Followed by CT surgery serially  Obesity.  Dyslipidemia.  Hypertension-situational component noted  Hypertensive cardiovascular disease  Erectile dysfunction     Recommendations:  Continuation of his current cardiovascular regimen at the present time.     This includes antihypertensives, statin, and antiplatelet therapy  Follow-up with CT surgery  "as scheduled  Follow-up in 6 months time, sooner should there be difficulties    Diagnoses and all orders for this visit:    1. Paroxysmal atrial fibrillation (HCC) (Primary)  -     ECG 12 Lead    2. Chronic coronary artery disease  -     ECG 12 Lead    3. Dyslipidemia  -     ECG 12 Lead    4. Primary hypertension  -     ECG 12 Lead    5. Hypertensive heart disease without heart failure  -     ECG 12 Lead    6. Coagulopathy (HCC)  -     ECG 12 Lead          Objective:    Vitals:  Vitals:    11/10/22 0843   BP: 140/85   Pulse: 61   SpO2: 98%   Weight: 116 kg (256 lb)   Height: 182.9 cm (72\")     Body mass index is 34.72 kg/m².      Physical Exam:    General: Alert, cooperative, no distress, appears stated age  Head:  Normocephalic, atraumatic, mucous membranes moist  Eyes:  Conjunctiva/corneas clear, EOM's intact     Neck:  Supple,  no bruit    Lungs: Clear to auscultation bilaterally, no wheezes rhonchi rales are noted  Chest wall: No tenderness  Heart::  Regular rate and rhythm, S1 and S2 normal, 1/6 holosystolic murmur.  No rub or gallop  Abdomen: Soft, non-tender, nondistended bowel sounds active  Extremities: No cyanosis, clubbing, or edema  Pulses: 2+ and symmetric all extremities  Skin:  No rashes or lesions  Neuro/psych: A&O x3. CN II through XII are grossly intact with appropriate affect      Allergies:  No Known Allergies    Medication Review:     Current Outpatient Medications:   •  aspirin 325 MG tablet, ASPIRIN 325 MG TABS, Disp: , Rfl:   •  atorvastatin (LIPITOR) 20 MG tablet, Take 1 tablet by mouth Daily., Disp: 90 tablet, Rfl: 3  •  clopidogrel (PLAVIX) 75 MG tablet, Take 1 tablet by mouth Daily., Disp: 90 tablet, Rfl: 3  •  Edarbyclor 40-12.5 MG tablet, TAKE 1 TABLET BY MOUTH ONCE DAILY, Disp: 90 tablet, Rfl: 0  •  metoprolol succinate XL (TOPROL-XL) 50 MG 24 hr tablet, Take 1 tablet by mouth Daily., Disp: 90 tablet, Rfl: 3  •  Omega-3 Fatty Acids (fish oil) 1000 MG capsule capsule, FISH OIL 1000 MG " CAPS, Disp: , Rfl:   •  sildenafil (VIAGRA) 25 MG tablet, Take 1 tablet by mouth Daily As Needed for Erectile Dysfunction., Disp: 20 tablet, Rfl: 3    Family History:  Family History   Problem Relation Age of Onset   • Colon cancer Mother    • Parkinsonism Father        Past Medical History:  Past Medical History:   Diagnosis Date   • Atrial fibrillation (HCC)    • Coagulopathy (HCC)    • Coronary artery disease    • Dyslipidemia    • Hyperlipidemia    • Hypertension    • Hypertensive cardiovascular disease    • Obesity        Past Surgical History:  Past Surgical History:   Procedure Laterality Date   • CARDIAC CATHETERIZATION     • CARDIOVERSION  2015   • CORONARY ANGIOPLASTY WITH STENT PLACEMENT  2015    Xience Alpine stent to ostial RCA- [facility]   • KNEE SURGERY Left 2012   • SHOULDER SURGERY Left        Social History:  Social History     Socioeconomic History   • Marital status:    Tobacco Use   • Smoking status: Former     Packs/day: 1.50     Years: 25.00     Pack years: 37.50     Types: Cigarettes     Start date: 1966     Quit date: 1991     Years since quittin.8   • Smokeless tobacco: Never   Vaping Use   • Vaping Use: Never used   Substance and Sexual Activity   • Alcohol use: Yes     Alcohol/week: 20.0 standard drinks     Types: 20 Cans of beer per week   • Drug use: No   • Sexual activity: Yes     Partners: Female       Review of Systems:  The following systems were reviewed as they relate to the cardiovascular system: Constitutional, Eyes, ENT, Cardiovascular, Respiratory, Gastrointestinal, Integumentary, Neurological, Psychiatric, Hematologic, Endocrine, Musculoskeletal, and Genitourinary. The pertinent cardiovascular findings are reported above with all other cardiovascular points within those systems being negative.    Diagnostic Study Review:     Current Electrocardiogram:    ECG 12 Lead    Date/Time: 11/10/2022 7:00 PM  Performed by: Meaghan  Adán Andrade DO  Authorized by: Adán Harding DO   Comparison: not compared with previous ECG   Previous ECG: no previous ECG available  Comments: Normal sinus rhythm with a ventricular rate of 63 bpm.  Borderline left axis deviation.  Early R wave transition.  Normal QT and QTc intervals.            Laboratory Data:  Lab Results   Component Value Date    GLUCOSE 114 (H) 09/20/2019    BUN 19 09/20/2019    CREATININE 0.90 12/09/2021    EGFRIFNONA 74 09/20/2019    BCR 19.0 09/20/2019    K 4.4 09/20/2019    CO2 25.0 09/20/2019    CALCIUM 8.8 (L) 09/20/2019    ALBUMIN 3.80 09/20/2019    AST 21 09/20/2019    ALT 17 09/20/2019     Lab Results   Component Value Date    GLUCOSE 114 (H) 09/20/2019    CALCIUM 8.8 (L) 09/20/2019     09/20/2019    K 4.4 09/20/2019    CO2 25.0 09/20/2019     09/20/2019    BUN 19 09/20/2019    CREATININE 0.90 12/09/2021    EGFRIFNONA 74 09/20/2019    BCR 19.0 09/20/2019    ANIONGAP 12.4 09/20/2019     Lab Results   Component Value Date    WBC 5.60 09/20/2019    HGB 13.2 09/20/2019    HCT 38.3 09/20/2019    MCV 91.5 09/20/2019     09/20/2019     Lab Results   Component Value Date    CHOL 165 09/20/2019    TRIG 46 09/20/2019    HDL 55 09/20/2019     (H) 09/20/2019     No results found for: HGBA1C  No results found for: INR, PROTIME    Most Recent Echo:  Results for orders placed during the hospital encounter of 05/26/21    Adult Transthoracic Echo Complete W/ Cont if Necessary Per Protocol    Interpretation Summary  · Left ventricular wall thickness is consistent with mild concentric hypertrophy.  · Estimated left ventricular EF = 65% Estimated left ventricular EF was in agreement with the calculated left ventricular EF. Left ventricular systolic function is normal.  · Mild dilation of the aortic root is present. Severe dilation of the ascending aorta is present.  · Left ventricular diastolic function is consistent with (grade I) impaired  relaxation.  · Estimated right ventricular systolic pressure from tricuspid regurgitation is normal (<35 mmHg).       Most Recent Stress Test:  Results for orders placed during the hospital encounter of 05/26/21    Stress Test With Myocardial Perfusion One Day    Interpretation Summary  · Myocardial perfusion imaging indicates a normal myocardial perfusion study with no evidence of ischemia.  · Left ventricular ejection fraction is normal. (Calculated EF = 60%).  · Impressions are consistent with a low risk study.       Most Recent Cardiac Catheterization:   No results found for this or any previous visit.       NOTE: The following portions of the patient's note were reviewed, confirmed and/or updated this visit as appropriate: History of present illness/Interval history, physical examination, assessment & plan, allergies, current medications, past family history, past medical history, past social history, past surgical history and problem list.

## 2023-01-25 DIAGNOSIS — I10 ESSENTIAL HYPERTENSION: ICD-10-CM

## 2023-01-25 RX ORDER — AZILSARTAN KAMEDOXOMIL AND CHLORTHALIDONE 40; 12.5 MG/1; MG/1
TABLET ORAL
Qty: 90 TABLET | Refills: 0 | Status: SHIPPED | OUTPATIENT
Start: 2023-01-25

## 2023-04-26 DIAGNOSIS — I10 ESSENTIAL HYPERTENSION: ICD-10-CM

## 2023-04-26 RX ORDER — AZILSARTAN KAMEDOXOMIL AND CHLORTHALIDONE 40; 12.5 MG/1; MG/1
TABLET ORAL
Qty: 90 TABLET | Refills: 0 | Status: SHIPPED | OUTPATIENT
Start: 2023-04-26

## 2023-05-11 ENCOUNTER — OFFICE VISIT (OUTPATIENT)
Dept: CARDIOLOGY | Facility: CLINIC | Age: 73
End: 2023-05-11
Payer: MEDICARE

## 2023-05-11 VITALS
WEIGHT: 253 LBS | SYSTOLIC BLOOD PRESSURE: 104 MMHG | DIASTOLIC BLOOD PRESSURE: 62 MMHG | BODY MASS INDEX: 35.42 KG/M2 | HEIGHT: 71 IN | HEART RATE: 43 BPM | OXYGEN SATURATION: 96 %

## 2023-05-11 DIAGNOSIS — I10 ESSENTIAL HYPERTENSION: ICD-10-CM

## 2023-05-11 DIAGNOSIS — I10 PRIMARY HYPERTENSION: ICD-10-CM

## 2023-05-11 DIAGNOSIS — I11.9 HYPERTENSIVE HEART DISEASE WITHOUT HEART FAILURE: ICD-10-CM

## 2023-05-11 DIAGNOSIS — I25.10 CHRONIC CORONARY ARTERY DISEASE: Primary | ICD-10-CM

## 2023-05-11 DIAGNOSIS — E78.5 DYSLIPIDEMIA: ICD-10-CM

## 2023-05-11 PROCEDURE — 99214 OFFICE O/P EST MOD 30 MIN: CPT | Performed by: INTERNAL MEDICINE

## 2023-05-11 PROCEDURE — 1160F RVW MEDS BY RX/DR IN RCRD: CPT | Performed by: INTERNAL MEDICINE

## 2023-05-11 PROCEDURE — 3074F SYST BP LT 130 MM HG: CPT | Performed by: INTERNAL MEDICINE

## 2023-05-11 PROCEDURE — 1159F MED LIST DOCD IN RCRD: CPT | Performed by: INTERNAL MEDICINE

## 2023-05-11 PROCEDURE — 3078F DIAST BP <80 MM HG: CPT | Performed by: INTERNAL MEDICINE

## 2023-05-11 PROCEDURE — 93000 ELECTROCARDIOGRAM COMPLETE: CPT | Performed by: INTERNAL MEDICINE

## 2023-05-11 RX ORDER — CLOPIDOGREL BISULFATE 75 MG/1
75 TABLET ORAL DAILY
Qty: 90 TABLET | Refills: 3 | Status: SHIPPED | OUTPATIENT
Start: 2023-05-11

## 2023-05-11 RX ORDER — AZILSARTAN KAMEDOXOMIL AND CHLORTHALIDONE 40; 12.5 MG/1; MG/1
1 TABLET ORAL DAILY
Qty: 90 TABLET | Refills: 3 | Status: SHIPPED | OUTPATIENT
Start: 2023-05-11

## 2023-05-11 RX ORDER — BIOTIN 1 MG
1000 TABLET ORAL 3 TIMES DAILY
COMMUNITY

## 2023-05-11 RX ORDER — SILDENAFIL 25 MG/1
25 TABLET, FILM COATED ORAL DAILY PRN
Qty: 20 TABLET | Refills: 3 | Status: SHIPPED | OUTPATIENT
Start: 2023-05-11

## 2023-05-11 RX ORDER — ATORVASTATIN CALCIUM 20 MG/1
20 TABLET, FILM COATED ORAL DAILY
Qty: 90 TABLET | Refills: 3 | Status: SHIPPED | OUTPATIENT
Start: 2023-05-11

## 2023-05-11 RX ORDER — METOPROLOL SUCCINATE 50 MG/1
50 TABLET, EXTENDED RELEASE ORAL DAILY
Qty: 90 TABLET | Refills: 3 | Status: SHIPPED | OUTPATIENT
Start: 2023-05-11

## 2023-05-11 NOTE — PROGRESS NOTES
Cardiology Office Visit      Encounter Date:  05/11/2023    Patient ID:   Nikolay Lam is a 72 y.o. male.    Reason For Followup:  Coronary artery disease  Paroxysmal atrial fibrillation    Brief Clinical History:  Dear Josep Edwards MD    I had the pleasure of seeing Nikolay Lam today. As you are well aware, this is a 72 y.o. male with a history of ischemic heart disease and proximal atrial fibrillation. He has additional history of dyslipidemia and hypertension. He presents today for follow-up on the above conditions.     Interval History:  He denies any chest pain pressure heaviness or tightness.  He denies any shortness of breath.  He denies any PND orthopnea.  He denies any syncope or near-syncope.  He reports feeling well from a cardiac perspective.  He jokes that he feels like he is 21 again.    He did follow-up with CT surgery regarding his ascending aortic aneurysm.  They will continue with routine follow-ups annually.    He and his wife just returned from a 1 month trip in [place].  They had a fantastic time.  They did a great deal of walking and neither of them had any difficulties.    Assessment & Plan     Impressions:  Coronary artery disease status post PCI of the ostial right Coronary artery with a drug-eluting stent in December 2015.  Paroxysmal AF with elevated Dell score.      Status post DC cardioversion in the past     Maintaining sinus rhythm without recurrence     Currently on full dose aspirin.  Hypertension with hypertensive cardiovascular disease  Ascending aortic aneurysm-5.1 cm CT angiography December 2021     Followed by CT surgery serially  Obesity.  Dyslipidemia.  Hypertension-situational component noted  Hypertensive cardiovascular disease  Erectile dysfunction     Recommendations:  Continuation of his current cardiovascular regimen at the present time.     This includes antihypertensives, statin, and antiplatelet therapy  Follow-up with CT surgery as scheduled  Follow-up  "in 6 months time, sooner should there be difficulties    Diagnoses and all orders for this visit:    1. Chronic coronary artery disease (Primary)  -     clopidogrel (PLAVIX) 75 MG tablet; Take 1 tablet by mouth Daily.  Dispense: 90 tablet; Refill: 3  -     ECG 12 Lead    2. Primary hypertension  -     ECG 12 Lead    3. Hypertensive heart disease without heart failure  -     ECG 12 Lead    4. Dyslipidemia  -     atorvastatin (LIPITOR) 20 MG tablet; Take 1 tablet by mouth Daily.  Dispense: 90 tablet; Refill: 3  -     ECG 12 Lead    5. Essential hypertension  -     Edarbyclor 40-12.5 MG tablet; Take 1 tablet by mouth Daily.  Dispense: 90 tablet; Refill: 3  -     metoprolol succinate XL (TOPROL-XL) 50 MG 24 hr tablet; Take 1 tablet by mouth Daily.  Dispense: 90 tablet; Refill: 3  -     ECG 12 Lead    Other orders  -     sildenafil (VIAGRA) 25 MG tablet; Take 1 tablet by mouth Daily As Needed for Erectile Dysfunction.  Dispense: 20 tablet; Refill: 3          Objective:    Vitals:  Vitals:    05/11/23 0917   BP: 104/62   BP Location: Left arm   Patient Position: Sitting   Pulse: (!) 43   SpO2: 96%   Weight: 115 kg (253 lb)   Height: 180.3 cm (71\")     Body mass index is 35.29 kg/m².      Physical Exam:    General: Alert, cooperative, no distress, appears stated age  Head:  Normocephalic, atraumatic, mucous membranes moist  Eyes:  Conjunctiva/corneas clear, EOM's intact     Neck:  Supple,  no bruit    Lungs: Clear to auscultation bilaterally, no wheezes rhonchi rales are noted  Chest wall: No tenderness  Heart::  Regular rate and rhythm, S1 and S2 normal, 1/6 holosystolic murmur.  No rub or gallop  Abdomen: Soft, non-tender, nondistended bowel sounds active  Extremities: No cyanosis, clubbing, or edema  Pulses: 2+ and symmetric all extremities  Skin:  No rashes or lesions  Neuro/psych: A&O x3. CN II through XII are grossly intact with appropriate affect      Allergies:  No Known Allergies    Medication Review:     Current " Outpatient Medications:   •  aspirin 325 MG tablet, ASPIRIN 325 MG TABS, Disp: , Rfl:   •  atorvastatin (LIPITOR) 20 MG tablet, Take 1 tablet by mouth Daily., Disp: 90 tablet, Rfl: 3  •  Biotin 1000 MCG tablet, Take 1,000 mcg by mouth 3 (Three) Times a Day., Disp: , Rfl:   •  clopidogrel (PLAVIX) 75 MG tablet, Take 1 tablet by mouth Daily., Disp: 90 tablet, Rfl: 3  •  Edarbyclor 40-12.5 MG tablet, Take 1 tablet by mouth Daily., Disp: 90 tablet, Rfl: 3  •  metoprolol succinate XL (TOPROL-XL) 50 MG 24 hr tablet, Take 1 tablet by mouth Daily., Disp: 90 tablet, Rfl: 3  •  Omega-3 Fatty Acids (fish oil) 1000 MG capsule capsule, FISH OIL 1000 MG CAPS, Disp: , Rfl:   •  Potassium 99 MG tablet, Take  by mouth., Disp: , Rfl:   •  sildenafil (VIAGRA) 25 MG tablet, Take 1 tablet by mouth Daily As Needed for Erectile Dysfunction., Disp: 20 tablet, Rfl: 3  •  vitamin D3 125 MCG (5000 UT) capsule capsule, Take 1 capsule by mouth Daily., Disp: , Rfl:     Family History:  Family History   Problem Relation Age of Onset   • Colon cancer Mother    • Parkinsonism Father        Past Medical History:  Past Medical History:   Diagnosis Date   • Atrial fibrillation    • Coagulopathy    • Coronary artery disease    • Dyslipidemia    • Hyperlipidemia    • Hypertension    • Hypertensive cardiovascular disease    • Obesity        Past Surgical History:  Past Surgical History:   Procedure Laterality Date   • CARDIAC CATHETERIZATION     • CARDIOVERSION  2015   • CORONARY ANGIOPLASTY WITH STENT PLACEMENT  2015    Xience Alpine stent to ostial RCA- [facility]   • KNEE SURGERY Left 2012   • SHOULDER SURGERY Left        Social History:  Social History     Socioeconomic History   • Marital status:    Tobacco Use   • Smoking status: Former     Packs/day: 1.50     Years: 25.00     Pack years: 37.50     Types: Cigarettes     Start date: 1966     Quit date: 1991     Years since quittin.3   • Smokeless  tobacco: Never   Vaping Use   • Vaping Use: Never used   Substance and Sexual Activity   • Alcohol use: Yes     Alcohol/week: 20.0 standard drinks     Types: 20 Cans of beer per week   • Drug use: No   • Sexual activity: Yes     Partners: Female       Review of Systems:  The following systems were reviewed as they relate to the cardiovascular system: Constitutional, Eyes, ENT, Cardiovascular, Respiratory, Gastrointestinal, Integumentary, Neurological, Psychiatric, Hematologic, Endocrine, Musculoskeletal, and Genitourinary. The pertinent cardiovascular findings are reported above with all other cardiovascular points within those systems being negative.    Diagnostic Study Review:     Current Electrocardiogram:    ECG 12 Lead    Date/Time: 5/11/2023 5:05 PM  Performed by: Adán Harding DO  Authorized by: Adán Harding DO   Comparison: not compared with previous ECG   Previous ECG: no previous ECG available  Comments: Normal sinus rhythm with a ventricular rate of 63 bpm.  PACs and PVCs noted.  Nonspecific interventricular conduction delay.  Early R wave transition.  Normal QT and QTc intervals.            Laboratory Data:  Lab Results   Component Value Date    GLUCOSE 114 (H) 09/20/2019    BUN 19 09/20/2019    CREATININE 0.90 12/09/2021    EGFRIFNONA 74 09/20/2019    BCR 19.0 09/20/2019    K 4.4 09/20/2019    CO2 25.0 09/20/2019    CALCIUM 8.8 (L) 09/20/2019    ALBUMIN 3.80 09/20/2019    AST 21 09/20/2019    ALT 17 09/20/2019     Lab Results   Component Value Date    GLUCOSE 114 (H) 09/20/2019    CALCIUM 8.8 (L) 09/20/2019     09/20/2019    K 4.4 09/20/2019    CO2 25.0 09/20/2019     09/20/2019    BUN 19 09/20/2019    CREATININE 0.90 12/09/2021    EGFRIFNONA 74 09/20/2019    BCR 19.0 09/20/2019    ANIONGAP 12.4 09/20/2019     Lab Results   Component Value Date    WBC 5.60 09/20/2019    HGB 13.2 09/20/2019    HCT 38.3 09/20/2019    MCV 91.5 09/20/2019     09/20/2019      Lab Results   Component Value Date    CHOL 165 09/20/2019    TRIG 46 09/20/2019    HDL 55 09/20/2019     (H) 09/20/2019     No results found for: HGBA1C  No results found for: INR, PROTIME    Most Recent Echo:  Results for orders placed during the hospital encounter of 05/26/21    Adult Transthoracic Echo Complete W/ Cont if Necessary Per Protocol    Interpretation Summary  · Left ventricular wall thickness is consistent with mild concentric hypertrophy.  · Estimated left ventricular EF = 65% Estimated left ventricular EF was in agreement with the calculated left ventricular EF. Left ventricular systolic function is normal.  · Mild dilation of the aortic root is present. Severe dilation of the ascending aorta is present.  · Left ventricular diastolic function is consistent with (grade I) impaired relaxation.  · Estimated right ventricular systolic pressure from tricuspid regurgitation is normal (<35 mmHg).       Most Recent Stress Test:  Results for orders placed during the hospital encounter of 05/26/21    Stress Test With Myocardial Perfusion One Day    Interpretation Summary  · Myocardial perfusion imaging indicates a normal myocardial perfusion study with no evidence of ischemia.  · Left ventricular ejection fraction is normal. (Calculated EF = 60%).  · Impressions are consistent with a low risk study.       Most Recent Cardiac Catheterization:   No results found for this or any previous visit.       NOTE: The following portions of the patient's note were reviewed, confirmed and/or updated this visit as appropriate: History of present illness/Interval history, physical examination, assessment & plan, allergies, current medications, past family history, past medical history, past social history, past surgical history and problem list.

## 2023-05-11 NOTE — LETTER
May 12, 2023     Josep Pickett MD  [address]  [address]  [place] [address]    Patient: Nikolay Lam   YOB: 1950   Date of Visit: 5/11/2023       Dear Dr. Nieves MD:    Thank you for referring Nikolay Lam to me for evaluation. Below are the relevant portions of my assessment and plan of care.    If you have questions, please do not hesitate to call me. I look forward to following Nikolay along with you.         Sincerely,        Adán Harding DO        CC: No Recipients    Adán Harding DO  05/12/23 1707  Signed  Cardiology Office Visit      Encounter Date:  05/11/2023    Patient ID:   Nikolay Lam is a 72 y.o. male.    Reason For Followup:  Coronary artery disease  Paroxysmal atrial fibrillation    Brief Clinical History:  Dear Josep Edwards MD    I had the pleasure of seeing Nikolay Lam today. As you are well aware, this is a 72 y.o. male with a history of ischemic heart disease and proximal atrial fibrillation. He has additional history of dyslipidemia and hypertension. He presents today for follow-up on the above conditions.     Interval History:  He denies any chest pain pressure heaviness or tightness.  He denies any shortness of breath.  He denies any PND orthopnea.  He denies any syncope or near-syncope.  He reports feeling well from a cardiac perspective.  He jokes that he feels like he is 21 again.    He did follow-up with CT surgery regarding his ascending aortic aneurysm.  They will continue with routine follow-ups annually.    He and his wife just returned from a 1 month trip in [place].  They had a fantastic time.  They did a great deal of walking and neither of them had any difficulties.    Assessment & Plan     Impressions:  Coronary artery disease status post PCI of the ostial right Coronary artery with a drug-eluting stent in December 2015.  Paroxysmal AF with elevated Dell score.      Status post DC cardioversion in the  "past     Maintaining sinus rhythm without recurrence     Currently on full dose aspirin.  Hypertension with hypertensive cardiovascular disease  Ascending aortic aneurysm-5.1 cm CT angiography December 2021     Followed by CT surgery serially  Obesity.  Dyslipidemia.  Hypertension-situational component noted  Hypertensive cardiovascular disease  Erectile dysfunction     Recommendations:  Continuation of his current cardiovascular regimen at the present time.     This includes antihypertensives, statin, and antiplatelet therapy  Follow-up with CT surgery as scheduled  Follow-up in 6 months time, sooner should there be difficulties    Diagnoses and all orders for this visit:    1. Chronic coronary artery disease (Primary)  -     clopidogrel (PLAVIX) 75 MG tablet; Take 1 tablet by mouth Daily.  Dispense: 90 tablet; Refill: 3  -     ECG 12 Lead    2. Primary hypertension  -     ECG 12 Lead    3. Hypertensive heart disease without heart failure  -     ECG 12 Lead    4. Dyslipidemia  -     atorvastatin (LIPITOR) 20 MG tablet; Take 1 tablet by mouth Daily.  Dispense: 90 tablet; Refill: 3  -     ECG 12 Lead    5. Essential hypertension  -     Edarbyclor 40-12.5 MG tablet; Take 1 tablet by mouth Daily.  Dispense: 90 tablet; Refill: 3  -     metoprolol succinate XL (TOPROL-XL) 50 MG 24 hr tablet; Take 1 tablet by mouth Daily.  Dispense: 90 tablet; Refill: 3  -     ECG 12 Lead    Other orders  -     sildenafil (VIAGRA) 25 MG tablet; Take 1 tablet by mouth Daily As Needed for Erectile Dysfunction.  Dispense: 20 tablet; Refill: 3          Objective:    Vitals:  Vitals:    05/11/23 0917   BP: 104/62   BP Location: Left arm   Patient Position: Sitting   Pulse: (!) 43   SpO2: 96%   Weight: 115 kg (253 lb)   Height: 180.3 cm (71\")     Body mass index is 35.29 kg/m².      Physical Exam:    General: Alert, cooperative, no distress, appears stated age  Head:  Normocephalic, atraumatic, mucous membranes moist  Eyes:  Conjunctiva/corneas " clear, EOM's intact     Neck:  Supple,  no bruit    Lungs: Clear to auscultation bilaterally, no wheezes rhonchi rales are noted  Chest wall: No tenderness  Heart::  Regular rate and rhythm, S1 and S2 normal, 1/6 holosystolic murmur.  No rub or gallop  Abdomen: Soft, non-tender, nondistended bowel sounds active  Extremities: No cyanosis, clubbing, or edema  Pulses: 2+ and symmetric all extremities  Skin:  No rashes or lesions  Neuro/psych: A&O x3. CN II through XII are grossly intact with appropriate affect      Allergies:  No Known Allergies    Medication Review:     Current Outpatient Medications:   •  aspirin 325 MG tablet, ASPIRIN 325 MG TABS, Disp: , Rfl:   •  atorvastatin (LIPITOR) 20 MG tablet, Take 1 tablet by mouth Daily., Disp: 90 tablet, Rfl: 3  •  Biotin 1000 MCG tablet, Take 1,000 mcg by mouth 3 (Three) Times a Day., Disp: , Rfl:   •  clopidogrel (PLAVIX) 75 MG tablet, Take 1 tablet by mouth Daily., Disp: 90 tablet, Rfl: 3  •  Edarbyclor 40-12.5 MG tablet, Take 1 tablet by mouth Daily., Disp: 90 tablet, Rfl: 3  •  metoprolol succinate XL (TOPROL-XL) 50 MG 24 hr tablet, Take 1 tablet by mouth Daily., Disp: 90 tablet, Rfl: 3  •  Omega-3 Fatty Acids (fish oil) 1000 MG capsule capsule, FISH OIL 1000 MG CAPS, Disp: , Rfl:   •  Potassium 99 MG tablet, Take  by mouth., Disp: , Rfl:   •  sildenafil (VIAGRA) 25 MG tablet, Take 1 tablet by mouth Daily As Needed for Erectile Dysfunction., Disp: 20 tablet, Rfl: 3  •  vitamin D3 125 MCG (5000 UT) capsule capsule, Take 1 capsule by mouth Daily., Disp: , Rfl:     Family History:  Family History   Problem Relation Age of Onset   • Colon cancer Mother    • Parkinsonism Father        Past Medical History:  Past Medical History:   Diagnosis Date   • Atrial fibrillation    • Coagulopathy    • Coronary artery disease    • Dyslipidemia    • Hyperlipidemia    • Hypertension    • Hypertensive cardiovascular disease    • Obesity        Past Surgical History:  Past Surgical  History:   Procedure Laterality Date   • CARDIAC CATHETERIZATION     • CARDIOVERSION  2015   • CORONARY ANGIOPLASTY WITH STENT PLACEMENT  2015    Xience Alpine stent to ostial RCA- [facility]   • KNEE SURGERY Left 2012   • SHOULDER SURGERY Left        Social History:  Social History     Socioeconomic History   • Marital status:    Tobacco Use   • Smoking status: Former     Packs/day: 1.50     Years: 25.00     Pack years: 37.50     Types: Cigarettes     Start date: 1966     Quit date: 1991     Years since quittin.3   • Smokeless tobacco: Never   Vaping Use   • Vaping Use: Never used   Substance and Sexual Activity   • Alcohol use: Yes     Alcohol/week: 20.0 standard drinks     Types: 20 Cans of beer per week   • Drug use: No   • Sexual activity: Yes     Partners: Female       Review of Systems:  The following systems were reviewed as they relate to the cardiovascular system: Constitutional, Eyes, ENT, Cardiovascular, Respiratory, Gastrointestinal, Integumentary, Neurological, Psychiatric, Hematologic, Endocrine, Musculoskeletal, and Genitourinary. The pertinent cardiovascular findings are reported above with all other cardiovascular points within those systems being negative.    Diagnostic Study Review:     Current Electrocardiogram:    ECG 12 Lead    Date/Time: 2023 5:05 PM  Performed by: Adán Harding DO  Authorized by: Adán Harding DO   Comparison: not compared with previous ECG   Previous ECG: no previous ECG available  Comments: Normal sinus rhythm with a ventricular rate of 63 bpm.  PACs and PVCs noted.  Nonspecific interventricular conduction delay.  Early R wave transition.  Normal QT and QTc intervals.            Laboratory Data:  Lab Results   Component Value Date    GLUCOSE 114 (H) 2019    BUN 19 2019    CREATININE 0.90 2021    EGFRIFNONA 74 2019    BCR 19.0 2019    K 4.4 2019    CO2  25.0 09/20/2019    CALCIUM 8.8 (L) 09/20/2019    ALBUMIN 3.80 09/20/2019    AST 21 09/20/2019    ALT 17 09/20/2019     Lab Results   Component Value Date    GLUCOSE 114 (H) 09/20/2019    CALCIUM 8.8 (L) 09/20/2019     09/20/2019    K 4.4 09/20/2019    CO2 25.0 09/20/2019     09/20/2019    BUN 19 09/20/2019    CREATININE 0.90 12/09/2021    EGFRIFNONA 74 09/20/2019    BCR 19.0 09/20/2019    ANIONGAP 12.4 09/20/2019     Lab Results   Component Value Date    WBC 5.60 09/20/2019    HGB 13.2 09/20/2019    HCT 38.3 09/20/2019    MCV 91.5 09/20/2019     09/20/2019     Lab Results   Component Value Date    CHOL 165 09/20/2019    TRIG 46 09/20/2019    HDL 55 09/20/2019     (H) 09/20/2019     No results found for: HGBA1C  No results found for: INR, PROTIME    Most Recent Echo:  Results for orders placed during the hospital encounter of 05/26/21    Adult Transthoracic Echo Complete W/ Cont if Necessary Per Protocol    Interpretation Summary  · Left ventricular wall thickness is consistent with mild concentric hypertrophy.  · Estimated left ventricular EF = 65% Estimated left ventricular EF was in agreement with the calculated left ventricular EF. Left ventricular systolic function is normal.  · Mild dilation of the aortic root is present. Severe dilation of the ascending aorta is present.  · Left ventricular diastolic function is consistent with (grade I) impaired relaxation.  · Estimated right ventricular systolic pressure from tricuspid regurgitation is normal (<35 mmHg).       Most Recent Stress Test:  Results for orders placed during the hospital encounter of 05/26/21    Stress Test With Myocardial Perfusion One Day    Interpretation Summary  · Myocardial perfusion imaging indicates a normal myocardial perfusion study with no evidence of ischemia.  · Left ventricular ejection fraction is normal. (Calculated EF = 60%).  · Impressions are consistent with a low risk study.       Most Recent Cardiac  Catheterization:   No results found for this or any previous visit.       NOTE: The following portions of the patient's note were reviewed, confirmed and/or updated this visit as appropriate: History of present illness/Interval history, physical examination, assessment & plan, allergies, current medications, past family history, past medical history, past social history, past surgical history and problem list.

## 2023-08-25 ENCOUNTER — HOSPITAL ENCOUNTER (OUTPATIENT)
Dept: CT IMAGING | Facility: HOSPITAL | Age: 73
Discharge: HOME | End: 2023-08-25
Payer: MEDICARE

## 2023-08-25 ENCOUNTER — HOSPITAL ENCOUNTER (OUTPATIENT)
Dept: CARDIOLOGY | Facility: HOSPITAL | Age: 73
Discharge: HOME | End: 2023-08-25
Payer: MEDICARE

## 2023-08-25 VITALS
HEIGHT: 71 IN | SYSTOLIC BLOOD PRESSURE: 144 MMHG | BODY MASS INDEX: 35.42 KG/M2 | DIASTOLIC BLOOD PRESSURE: 67 MMHG | WEIGHT: 253 LBS

## 2023-08-25 DIAGNOSIS — I71.21 ANEURYSM OF ASCENDING AORTA WITHOUT RUPTURE: ICD-10-CM

## 2023-08-25 LAB
CREAT BLDA-MCNC: 1 MG/DL (ref 0.6–1.3)
EGFRCR SERPLBLD CKD-EPI 2021: 79 ML/MIN/1.73

## 2023-08-25 PROCEDURE — 71275 CT ANGIOGRAPHY CHEST: CPT

## 2023-08-25 PROCEDURE — 25510000001 IOPAMIDOL PER 1 ML: Performed by: NURSE PRACTITIONER

## 2023-08-25 PROCEDURE — 93306 TTE W/DOPPLER COMPLETE: CPT

## 2023-08-25 PROCEDURE — 82565 ASSAY OF CREATININE: CPT

## 2023-08-25 RX ADMIN — IOPAMIDOL 100 ML: 755 INJECTION, SOLUTION INTRAVENOUS at 10:30

## 2023-08-26 LAB
BH CV ECHO MEAS - ACS: 2.09 CM
BH CV ECHO MEAS - AO MAX PG: 7.3 MMHG
BH CV ECHO MEAS - AO MEAN PG: 4.1 MMHG
BH CV ECHO MEAS - AO ROOT DIAM: 3.6 CM
BH CV ECHO MEAS - AO V2 MAX: 134.9 CM/SEC
BH CV ECHO MEAS - AO V2 VTI: 33.9 CM
BH CV ECHO MEAS - AVA(I,D): 3.5 CM2
BH CV ECHO MEAS - EDV(CUBED): 166.6 ML
BH CV ECHO MEAS - EDV(MOD-SP4): 133.9 ML
BH CV ECHO MEAS - EF(MOD-SP4): 64 %
BH CV ECHO MEAS - ESV(CUBED): 43.7 ML
BH CV ECHO MEAS - ESV(MOD-SP4): 48.3 ML
BH CV ECHO MEAS - FS: 36 %
BH CV ECHO MEAS - IVS/LVPW: 1.05 CM
BH CV ECHO MEAS - IVSD: 1.25 CM
BH CV ECHO MEAS - LA DIMENSION: 4.4 CM
BH CV ECHO MEAS - LV DIASTOLIC VOL/BSA (35-75): 57.5 CM2
BH CV ECHO MEAS - LV MASS(C)D: 278.5 GRAMS
BH CV ECHO MEAS - LV MAX PG: 4.7 MMHG
BH CV ECHO MEAS - LV MEAN PG: 2.27 MMHG
BH CV ECHO MEAS - LV SYSTOLIC VOL/BSA (12-30): 20.7 CM2
BH CV ECHO MEAS - LV V1 MAX: 108.8 CM/SEC
BH CV ECHO MEAS - LV V1 VTI: 25.8 CM
BH CV ECHO MEAS - LVIDD: 5.5 CM
BH CV ECHO MEAS - LVIDS: 3.5 CM
BH CV ECHO MEAS - LVOT AREA: 4.7 CM2
BH CV ECHO MEAS - LVOT DIAM: 2.43 CM
BH CV ECHO MEAS - LVPWD: 1.19 CM
BH CV ECHO MEAS - MR MAX PG: 105 MMHG
BH CV ECHO MEAS - MR MAX VEL: 512 CM/SEC
BH CV ECHO MEAS - MV A MAX VEL: 96.3 CM/SEC
BH CV ECHO MEAS - MV DEC SLOPE: 293.6 CM/SEC2
BH CV ECHO MEAS - MV DEC TIME: 0.32 MSEC
BH CV ECHO MEAS - MV E MAX VEL: 93.4 CM/SEC
BH CV ECHO MEAS - MV E/A: 0.97
BH CV ECHO MEAS - MV MAX PG: 5 MMHG
BH CV ECHO MEAS - MV MEAN PG: 1.78 MMHG
BH CV ECHO MEAS - MV V2 VTI: 37 CM
BH CV ECHO MEAS - MVA(VTI): 3.2 CM2
BH CV ECHO MEAS - PA ACC TIME: 0.19 SEC
BH CV ECHO MEAS - PA V2 MAX: 88 CM/SEC
BH CV ECHO MEAS - PI END-D VEL: 117.4 CM/SEC
BH CV ECHO MEAS - RV MAX PG: 2.26 MMHG
BH CV ECHO MEAS - RV V1 MAX: 75.1 CM/SEC
BH CV ECHO MEAS - RV V1 VTI: 21.3 CM
BH CV ECHO MEAS - RVDD: 4.1 CM
BH CV ECHO MEAS - SI(MOD-SP4): 36.8 ML/M2
BH CV ECHO MEAS - SV(LVOT): 119.8 ML
BH CV ECHO MEAS - SV(MOD-SP4): 85.7 ML
BH CV ECHO MEAS - TR MAX PG: 18 MMHG
BH CV ECHO MEAS - TR MAX VEL: 208.9 CM/SEC

## 2023-09-21 ENCOUNTER — OFFICE VISIT (OUTPATIENT)
Dept: CARDIAC SURGERY | Facility: CLINIC | Age: 73
End: 2023-09-21
Payer: MEDICARE

## 2023-09-21 VITALS
DIASTOLIC BLOOD PRESSURE: 84 MMHG | TEMPERATURE: 97.8 F | BODY MASS INDEX: 34.54 KG/M2 | HEIGHT: 72 IN | OXYGEN SATURATION: 96 % | HEART RATE: 52 BPM | RESPIRATION RATE: 18 BRPM | WEIGHT: 255 LBS | SYSTOLIC BLOOD PRESSURE: 143 MMHG

## 2023-09-21 DIAGNOSIS — I71.21 ANEURYSM OF ASCENDING AORTA WITHOUT RUPTURE: Primary | ICD-10-CM

## 2023-09-21 NOTE — PROGRESS NOTES
9/21/2023      Subjective:      Josep Pickett MD    Chief Complaint: Ascending aortic aneurysm    History of Present Illness:       Dear Josep Edwards MD and Colleagues,    It was nice to see Nikolay Lam in consultation at your request. He is a 72 y.o. male with hypertension, hyperlipidemia, coagulopathy, chronic coronary artery disease, atrial fibrillation, who is being followed up in our aortic clinic because of his ascending aortic aneurysm.  He saw Dr. Sánchez 1 year ago and his aorta at that time measured 5.1 cm, which has been stable over the last years.  New CT scan on 8/25/2023 was reviewed by me and the ascending aorta measures maximally 5cm.  He denies shortness of breath, chest/back pain, numbness/tingling/pain of extremities.  No vascular deficiencies or hyperextensible or hypermobile joints are noted on exam.  The patient's family history is negative for aneurysms or dissections, negative for connective tissue disease.  Social history:      Patient Active Problem List   Diagnosis    Atrial fibrillation    Chronic coronary artery disease    Coagulopathy    Dyslipidemia    Hypertension    Hypertensive cardiovascular disease    Obesity    Ear pain, right    Branch retinal vein occlusion with macular edema    Combined form of senile cataract    Elevated bilirubin    Epiretinal membrane    Vitreous degeneration    Erectile dysfunction    Right knee pain    Ascending aortic aneurysm       Past Medical History:   Diagnosis Date    Aneurysm     Atrial fibrillation     Coagulopathy     Coronary artery disease     Dyslipidemia     Hyperlipidemia     Hypertension     Hypertensive cardiovascular disease     Obesity        Past Surgical History:   Procedure Laterality Date    CARDIAC CATHETERIZATION      CARDIOVERSION  12/2015    CAROTID STENT      CORONARY ANGIOPLASTY WITH STENT PLACEMENT  12/18/2015    Xience Alpine stent to ostial [facility]- [facility]    KNEE SURGERY Left 01/04/2012     SHOULDER SURGERY Left        No Known Allergies      Current Outpatient Medications:     aspirin 325 MG tablet, ASPIRIN 325 MG TABS, Disp: , Rfl:     atorvastatin (LIPITOR) 20 MG tablet, Take 1 tablet by mouth Daily., Disp: 90 tablet, Rfl: 3    Biotin 1000 MCG tablet, Take 1,000 mcg by mouth 3 (Three) Times a Day., Disp: , Rfl:     clopidogrel (PLAVIX) 75 MG tablet, Take 1 tablet by mouth Daily., Disp: 90 tablet, Rfl: 3    Edarbyclor 40-12.5 MG tablet, TAKE 1 TABLET BY MOUTH ONCE DAILY, Disp: 90 tablet, Rfl: 0    metoprolol succinate XL (TOPROL-XL) 50 MG 24 hr tablet, Take 1 tablet by mouth Daily., Disp: 90 tablet, Rfl: 3    Omega-3 Fatty Acids (fish oil) 1000 MG capsule capsule, FISH OIL 1000 MG CAPS, Disp: , Rfl:     Potassium 99 MG tablet, Take  by mouth., Disp: , Rfl:     sildenafil (VIAGRA) 25 MG tablet, Take 1 tablet by mouth Daily As Needed for Erectile Dysfunction., Disp: 20 tablet, Rfl: 3    vitamin D3 125 MCG (5000 UT) capsule capsule, Take 1 capsule by mouth Daily., Disp: , Rfl:     Social History     Socioeconomic History    Marital status:    Tobacco Use    Smoking status: Former     Packs/day: 1.50     Years: 25.00     Pack years: 37.50     Types: Cigarettes     Start date: 1966     Quit date: 1991     Years since quittin.7    Smokeless tobacco: Never   Vaping Use    Vaping Use: Never used   Substance and Sexual Activity    Alcohol use: Yes     Alcohol/week: 14.0 standard drinks     Types: 14 Cans of beer per week    Drug use: No    Sexual activity: Yes     Partners: Female       Family History   Problem Relation Age of Onset    Colon cancer Mother     Parkinsonism Father        The following portions of the patient's history were reviewed and updated as appropriate: He  has a past medical history of Aneurysm, Atrial fibrillation, Coagulopathy, Coronary artery disease, Dyslipidemia, Hyperlipidemia, Hypertension, Hypertensive cardiovascular disease, and Obesity.  He does not have  any pertinent problems on file.  He  has a past surgical history that includes Cardioversion (12/2015); Coronary angioplasty with stent (12/18/2015); Cardiac catheterization; Knee surgery (Left, 01/04/2012); Shoulder surgery (Left); and Carotid stent.  His family history includes Colon cancer in his mother; Parkinsonism in his father.  He  reports that he quit smoking about 31 years ago. His smoking use included cigarettes. He started smoking about 57 years ago. He has a 37.50 pack-year smoking history. He has never used smokeless tobacco. He reports current alcohol use of about 14.0 standard drinks per week. He reports that he does not use drugs.  Current Outpatient Medications   Medication Sig Dispense Refill    aspirin 325 MG tablet ASPIRIN 325 MG TABS      atorvastatin (LIPITOR) 20 MG tablet Take 1 tablet by mouth Daily. 90 tablet 3    Biotin 1000 MCG tablet Take 1,000 mcg by mouth 3 (Three) Times a Day.      clopidogrel (PLAVIX) 75 MG tablet Take 1 tablet by mouth Daily. 90 tablet 3    Edarbyclor 40-12.5 MG tablet TAKE 1 TABLET BY MOUTH ONCE DAILY 90 tablet 0    metoprolol succinate XL (TOPROL-XL) 50 MG 24 hr tablet Take 1 tablet by mouth Daily. 90 tablet 3    Omega-3 Fatty Acids (fish oil) 1000 MG capsule capsule FISH OIL 1000 MG CAPS      Potassium 99 MG tablet Take  by mouth.      sildenafil (VIAGRA) 25 MG tablet Take 1 tablet by mouth Daily As Needed for Erectile Dysfunction. 20 tablet 3    vitamin D3 125 MCG (5000 UT) capsule capsule Take 1 capsule by mouth Daily.       No current facility-administered medications for this visit.     Current Outpatient Medications on File Prior to Visit   Medication Sig    aspirin 325 MG tablet ASPIRIN 325 MG TABS    atorvastatin (LIPITOR) 20 MG tablet Take 1 tablet by mouth Daily.    Biotin 1000 MCG tablet Take 1,000 mcg by mouth 3 (Three) Times a Day.    clopidogrel (PLAVIX) 75 MG tablet Take 1 tablet by mouth Daily.    Edarbyclor 40-12.5 MG tablet TAKE 1 TABLET BY MOUTH  ONCE DAILY    metoprolol succinate XL (TOPROL-XL) 50 MG 24 hr tablet Take 1 tablet by mouth Daily.    Omega-3 Fatty Acids (fish oil) 1000 MG capsule capsule FISH OIL 1000 MG CAPS    Potassium 99 MG tablet Take  by mouth.    sildenafil (VIAGRA) 25 MG tablet Take 1 tablet by mouth Daily As Needed for Erectile Dysfunction.    vitamin D3 125 MCG (5000 UT) capsule capsule Take 1 capsule by mouth Daily.     No current facility-administered medications on file prior to visit.     He has No Known Allergies..    Review of Systems:  Review of Systems    Physical Exam:    Vital Signs:  Weight: 116 kg (255 lb)   Body mass index is 34.58 kg/m².  Temp: 97.8 °F (36.6 °C)   Heart Rate: 52   BP: 143/84     Physical Exam     Assessment:     -Ascending aortic aneurysm.  5 cm without dissection or ulceration.  Asymptomatic.  Aneurysm is stable over the last years.  Recommend longitudinal follow-up with a CTA in 1 year  -Hypertension.  Well-controlled.  Continue same regimen    Recommendation/Plan:     We discussed the importance of avoidance of over the counter decongestants and stimulants, specifically pseudoephedrine, for hypertension and aneurysm management    Risk factor modification of hypertension with a goal blood pressure less than 130/80, hyperlipidemia optimization, and continued avoidance of tobacco/nicotine products.    Initiation of low aerobic exercise is indicated to help reduce body habitus, assist with blood pressure and cholesterol control.       Although risk of rupture is low, emergency department presentation is warranted for acute chest, back, or abdominal pain, syncope, or stroke like symptoms    Thank you for allowing us to participate in his care.    Regards,    Bar Moreland MD    ** all problems new to this practitioner, extensive review of records prior and during patient interview, >40 minutes in face to face conversation regarding treatment plan, education, and answering any questions the patient  may have had

## 2023-10-04 DIAGNOSIS — I10 ESSENTIAL HYPERTENSION: ICD-10-CM

## 2023-10-04 RX ORDER — AZILSARTAN KAMEDOXOMIL AND CHLORTHALIDONE 40; 12.5 MG/1; MG/1
TABLET ORAL
Qty: 90 TABLET | Refills: 0 | Status: SHIPPED | OUTPATIENT
Start: 2023-10-04

## 2023-12-07 ENCOUNTER — OFFICE VISIT (OUTPATIENT)
Dept: CARDIOLOGY | Facility: CLINIC | Age: 73
End: 2023-12-07
Payer: MEDICARE

## 2023-12-07 VITALS
HEIGHT: 71 IN | SYSTOLIC BLOOD PRESSURE: 133 MMHG | OXYGEN SATURATION: 98 % | HEART RATE: 53 BPM | WEIGHT: 260 LBS | BODY MASS INDEX: 36.4 KG/M2 | DIASTOLIC BLOOD PRESSURE: 73 MMHG

## 2023-12-07 DIAGNOSIS — R55 SYNCOPE AND COLLAPSE: ICD-10-CM

## 2023-12-07 DIAGNOSIS — I10 PRIMARY HYPERTENSION: ICD-10-CM

## 2023-12-07 DIAGNOSIS — I71.21 ANEURYSM OF ASCENDING AORTA WITHOUT RUPTURE: ICD-10-CM

## 2023-12-07 DIAGNOSIS — I25.10 CHRONIC CORONARY ARTERY DISEASE: Primary | ICD-10-CM

## 2023-12-07 DIAGNOSIS — I11.9 HYPERTENSIVE HEART DISEASE WITHOUT HEART FAILURE: ICD-10-CM

## 2023-12-07 PROCEDURE — 99214 OFFICE O/P EST MOD 30 MIN: CPT | Performed by: INTERNAL MEDICINE

## 2023-12-07 PROCEDURE — 1159F MED LIST DOCD IN RCRD: CPT | Performed by: INTERNAL MEDICINE

## 2023-12-07 PROCEDURE — 93000 ELECTROCARDIOGRAM COMPLETE: CPT | Performed by: INTERNAL MEDICINE

## 2023-12-07 PROCEDURE — 3078F DIAST BP <80 MM HG: CPT | Performed by: INTERNAL MEDICINE

## 2023-12-07 PROCEDURE — 3075F SYST BP GE 130 - 139MM HG: CPT | Performed by: INTERNAL MEDICINE

## 2023-12-07 PROCEDURE — 1160F RVW MEDS BY RX/DR IN RCRD: CPT | Performed by: INTERNAL MEDICINE

## 2023-12-07 NOTE — PROGRESS NOTES
Cardiology Office Visit      Encounter Date:  12/07/2023    Patient ID:   Nikolay Lam is a 73 y.o. male.    Reason For Followup:  Coronary artery disease  Paroxysmal atrial fibrillation    Brief Clinical History:  Dear Josep Edwards MD    I had the pleasure of seeing Nikolay Lam today. As you are well aware, this is a 73 y.o. male with a history of ischemic heart disease and proximal atrial fibrillation. He has additional history of dyslipidemia and hypertension. He presents today for follow-up on the above conditions.     Interval History:  He denies any chest pain pressure heaviness or tightness.  He denies any shortness of breath.  He denies any PND orthopnea.      He does report 1 episode of syncope.  The episode occurred while his wife was cutting his hair.  He reports that he had a towel tightly wrapped around his neck and he was actually holding the towel with his fingers near his carotid arteries.  He reports that he looked down so that she could cut the hair on the back of his neck and he became lightheaded and actually blacked out.  His wife accompanies him today and notes that he became very diaphoretic.  She put cool water on him and he eventually came back around.  He denied any other symptoms and this has not occurred before or since.  Most likely, the patient had a vagal episode that may have been related to the tight towel and carotid sensitivity.    He did follow-up with CT surgery regarding his ascending aortic aneurysm.  They will continue with routine follow-ups annually.    Assessment & Plan     Impressions:  Coronary artery disease status post PCI of the ostial right Coronary artery with a drug-eluting stent in December 2015.  Paroxysmal AF with elevated Dell score.      Status post DC cardioversion in the past     Maintaining sinus rhythm without recurrence     Currently on full dose aspirin.  Hypertension with hypertensive cardiovascular disease  Ascending aortic aneurysm-5.1  "cm CT angiography December 2021     Followed by CT surgery serially  Obesity.  Dyslipidemia.  Hypertension-situational component noted  Hypertensive cardiovascular disease  Erectile dysfunction  Syncope likely related to carotid sinus sensitivity and vagal response.     Recommendations:  Continuation of his current cardiovascular regimen at the present time.     This includes antihypertensives, statin, and antiplatelet therapy  Follow-up with CT surgery as scheduled  Follow-up in 6 months time, sooner should there be difficulties    Diagnoses and all orders for this visit:    1. Chronic coronary artery disease (Primary)  -     ECG 12 Lead    2. Primary hypertension  -     ECG 12 Lead    3. Hypertensive heart disease without heart failure  -     ECG 12 Lead    4. Aneurysm of ascending aorta without rupture  -     ECG 12 Lead    5. Syncope and collapse  -     ECG 12 Lead            Objective:    Vitals:  Vitals:    12/07/23 1001   BP: 133/73   BP Location: Left arm   Patient Position: Sitting   Pulse: 53   SpO2: 98%   Weight: 118 kg (260 lb)   Height: 180.3 cm (71\")     Body mass index is 36.26 kg/m².      Physical Exam:    General: Alert, cooperative, no distress, appears stated age  Head:  Normocephalic, atraumatic, mucous membranes moist  Eyes:  Conjunctiva/corneas clear, EOM's intact     Neck:  Supple,  no bruit    Lungs: Clear to auscultation bilaterally, no wheezes rhonchi rales are noted  Chest wall: No tenderness  Heart::  Regular rate and rhythm, S1 and S2 normal, 1/6 holosystolic murmur.  No rub or gallop  Abdomen: Soft, non-tender, nondistended bowel sounds active  Extremities: No cyanosis, clubbing, or edema  Pulses: 2+ and symmetric all extremities  Skin:  No rashes or lesions  Neuro/psych: A&O x3. CN II through XII are grossly intact with appropriate affect      Allergies:  No Known Allergies    Medication Review:     Current Outpatient Medications:     aspirin 325 MG tablet, ASPIRIN 325 MG TABS, Disp: , " Rfl:     atorvastatin (LIPITOR) 20 MG tablet, Take 1 tablet by mouth Daily., Disp: 90 tablet, Rfl: 3    Biotin 1000 MCG tablet, Take 1,000 mcg by mouth 3 (Three) Times a Day., Disp: , Rfl:     clopidogrel (PLAVIX) 75 MG tablet, Take 1 tablet by mouth Daily., Disp: 90 tablet, Rfl: 3    Edarbyclor 40-12.5 MG tablet, TAKE 1 TABLET BY MOUTH ONCE DAILY, Disp: 90 tablet, Rfl: 0    metoprolol succinate XL (TOPROL-XL) 50 MG 24 hr tablet, Take 1 tablet by mouth Daily., Disp: 90 tablet, Rfl: 3    Omega-3 Fatty Acids (fish oil) 1000 MG capsule capsule, FISH OIL 1000 MG CAPS, Disp: , Rfl:     Potassium 99 MG tablet, Take  by mouth., Disp: , Rfl:     sildenafil (VIAGRA) 25 MG tablet, Take 1 tablet by mouth Daily As Needed for Erectile Dysfunction., Disp: 20 tablet, Rfl: 3    vitamin D3 125 MCG (5000 UT) capsule capsule, Take 1 capsule by mouth Daily., Disp: , Rfl:     Family History:  Family History   Problem Relation Age of Onset    Colon cancer Mother     Parkinsonism Father        Past Medical History:  Past Medical History:   Diagnosis Date    Aneurysm     Atrial fibrillation     Coagulopathy     Coronary artery disease     Dyslipidemia     Hyperlipidemia     Hypertension     Hypertensive cardiovascular disease     Obesity        Past Surgical History:  Past Surgical History:   Procedure Laterality Date    CARDIAC CATHETERIZATION      CARDIOVERSION  2015    CAROTID STENT      CORONARY ANGIOPLASTY WITH STENT PLACEMENT  2015    Xience Alpine stent to ostial RCA- [facility]    KNEE SURGERY Left 2012    SHOULDER SURGERY Left        Social History:  Social History     Socioeconomic History    Marital status:    Tobacco Use    Smoking status: Former     Packs/day: 1.50     Years: 25.00     Additional pack years: 0.00     Total pack years: 37.50     Types: Cigarettes     Start date: 1966     Quit date: 1991     Years since quittin.9     Passive exposure: Past    Smokeless tobacco:  Never   Vaping Use    Vaping Use: Never used   Substance and Sexual Activity    Alcohol use: Yes     Alcohol/week: 14.0 standard drinks of alcohol     Types: 14 Cans of beer per week    Drug use: No    Sexual activity: Yes     Partners: Female       Review of Systems:  The following systems were reviewed as they relate to the cardiovascular system: Constitutional, Eyes, ENT, Cardiovascular, Respiratory, Gastrointestinal, Integumentary, Neurological, Psychiatric, Hematologic, Endocrine, Musculoskeletal, and Genitourinary. The pertinent cardiovascular findings are reported above with all other cardiovascular points within those systems being negative.    Diagnostic Study Review:     Current Electrocardiogram:    ECG 12 Lead    Date/Time: 12/7/2023 5:58 PM  Performed by: Adán Harding DO    Authorized by: Adán Harding DO  Comparison: not compared with previous ECG   Previous ECG: no previous ECG available  Comments: Normal sinus rhythm with a ventricular rate of 62 bpm.  Borderline left axis deviation.  Early R wave transition.  Normal QT and QTc intervals.          Laboratory Data:  Lab Results   Component Value Date    GLUCOSE 114 (H) 09/20/2019    BUN 19 09/20/2019    CREATININE 1.00 08/25/2023    EGFRIFNONA 74 09/20/2019    BCR 19.0 09/20/2019    K 4.4 09/20/2019    CO2 25.0 09/20/2019    CALCIUM 8.8 (L) 09/20/2019    ALBUMIN 3.80 09/20/2019    AST 21 09/20/2019    ALT 17 09/20/2019     Lab Results   Component Value Date    GLUCOSE 114 (H) 09/20/2019    CALCIUM 8.8 (L) 09/20/2019     09/20/2019    K 4.4 09/20/2019    CO2 25.0 09/20/2019     09/20/2019    BUN 19 09/20/2019    CREATININE 1.00 08/25/2023    EGFRIFNONA 74 09/20/2019    BCR 19.0 09/20/2019    ANIONGAP 12.4 09/20/2019     Lab Results   Component Value Date    WBC 5.60 09/20/2019    HGB 13.2 09/20/2019    HCT 38.3 09/20/2019    MCV 91.5 09/20/2019     09/20/2019     Lab Results   Component Value Date     "CHOL 165 09/20/2019    TRIG 46 09/20/2019    HDL 55 09/20/2019     (H) 09/20/2019     No results found for: \"HGBA1C\"  No results found for: \"INR\", \"PROTIME\"    Most Recent Echo:  Results for orders placed during the hospital encounter of 08/25/23    Adult Transthoracic Echo Complete w/ Color, Spectral and Contrast if necessary per protocol    Interpretation Summary    Left ventricular systolic function is normal. Left ventricular ejection fraction appears to be 56 - 60%.    Left ventricular wall thickness is consistent with mild concentric hypertrophy.       Most Recent Stress Test:  Results for orders placed during the hospital encounter of 05/26/21    Stress Test With Myocardial Perfusion One Day    Interpretation Summary  · Myocardial perfusion imaging indicates a normal myocardial perfusion study with no evidence of ischemia.  · Left ventricular ejection fraction is normal. (Calculated EF = 60%).  · Impressions are consistent with a low risk study.       Most Recent Cardiac Catheterization:   No results found for this or any previous visit.       NOTE: The following portions of the patient's note were reviewed, confirmed and/or updated this visit as appropriate: History of present illness/Interval history, physical examination, assessment & plan, allergies, current medications, past family history, past medical history, past social history, past surgical history and problem list.  "

## 2024-01-16 DIAGNOSIS — I10 ESSENTIAL HYPERTENSION: ICD-10-CM

## 2024-01-16 RX ORDER — AZILSARTAN KAMEDOXOMIL AND CHLORTHALIDONE 40; 12.5 MG/1; MG/1
TABLET ORAL
Qty: 90 TABLET | Refills: 0 | Status: SHIPPED | OUTPATIENT
Start: 2024-01-16

## 2024-04-09 DIAGNOSIS — I10 ESSENTIAL HYPERTENSION: ICD-10-CM

## 2024-04-09 RX ORDER — AZILSARTAN KAMEDOXOMIL AND CHLORTHALIDONE 40; 12.5 MG/1; MG/1
TABLET ORAL
Qty: 90 TABLET | Refills: 0 | Status: SHIPPED | OUTPATIENT
Start: 2024-04-09

## 2024-05-11 DIAGNOSIS — I25.10 CHRONIC CORONARY ARTERY DISEASE: ICD-10-CM

## 2024-05-11 DIAGNOSIS — I10 ESSENTIAL HYPERTENSION: ICD-10-CM

## 2024-05-11 DIAGNOSIS — E78.5 DYSLIPIDEMIA: ICD-10-CM

## 2024-05-13 RX ORDER — METOPROLOL SUCCINATE 50 MG/1
50 TABLET, EXTENDED RELEASE ORAL DAILY
Qty: 90 TABLET | Refills: 0 | Status: SHIPPED | OUTPATIENT
Start: 2024-05-13

## 2024-05-13 RX ORDER — CLOPIDOGREL BISULFATE 75 MG/1
75 TABLET ORAL DAILY
Qty: 90 TABLET | Refills: 0 | Status: SHIPPED | OUTPATIENT
Start: 2024-05-13

## 2024-05-13 RX ORDER — ATORVASTATIN CALCIUM 20 MG/1
20 TABLET, FILM COATED ORAL DAILY
Qty: 90 TABLET | Refills: 0 | Status: SHIPPED | OUTPATIENT
Start: 2024-05-13

## 2024-06-06 ENCOUNTER — OFFICE VISIT (OUTPATIENT)
Dept: CARDIOLOGY | Facility: CLINIC | Age: 74
End: 2024-06-06
Payer: MEDICARE

## 2024-06-06 VITALS
BODY MASS INDEX: 36.96 KG/M2 | SYSTOLIC BLOOD PRESSURE: 132 MMHG | OXYGEN SATURATION: 98 % | DIASTOLIC BLOOD PRESSURE: 58 MMHG | WEIGHT: 264 LBS | HEART RATE: 57 BPM | HEIGHT: 71 IN

## 2024-06-06 DIAGNOSIS — I11.9 HYPERTENSIVE HEART DISEASE WITHOUT HEART FAILURE: ICD-10-CM

## 2024-06-06 DIAGNOSIS — I10 PRIMARY HYPERTENSION: ICD-10-CM

## 2024-06-06 DIAGNOSIS — E78.5 DYSLIPIDEMIA: ICD-10-CM

## 2024-06-06 DIAGNOSIS — I25.10 CHRONIC CORONARY ARTERY DISEASE: Primary | ICD-10-CM

## 2024-06-06 DIAGNOSIS — I10 ESSENTIAL HYPERTENSION: ICD-10-CM

## 2024-06-06 PROCEDURE — 3078F DIAST BP <80 MM HG: CPT | Performed by: INTERNAL MEDICINE

## 2024-06-06 PROCEDURE — 93000 ELECTROCARDIOGRAM COMPLETE: CPT | Performed by: INTERNAL MEDICINE

## 2024-06-06 PROCEDURE — 99214 OFFICE O/P EST MOD 30 MIN: CPT | Performed by: INTERNAL MEDICINE

## 2024-06-06 PROCEDURE — 3075F SYST BP GE 130 - 139MM HG: CPT | Performed by: INTERNAL MEDICINE

## 2024-06-06 PROCEDURE — 1159F MED LIST DOCD IN RCRD: CPT | Performed by: INTERNAL MEDICINE

## 2024-06-06 PROCEDURE — 1160F RVW MEDS BY RX/DR IN RCRD: CPT | Performed by: INTERNAL MEDICINE

## 2024-06-06 RX ORDER — AZILSARTAN KAMEDOXOMIL AND CHLORTHALIDONE 40; 12.5 MG/1; MG/1
1 TABLET ORAL DAILY
Qty: 90 TABLET | Refills: 0 | Status: SHIPPED | OUTPATIENT
Start: 2024-06-06

## 2024-06-06 RX ORDER — SILDENAFIL 25 MG/1
25 TABLET, FILM COATED ORAL DAILY PRN
Qty: 20 TABLET | Refills: 3 | Status: SHIPPED | OUTPATIENT
Start: 2024-06-06

## 2024-06-06 RX ORDER — METOPROLOL SUCCINATE 50 MG/1
50 TABLET, EXTENDED RELEASE ORAL DAILY
Qty: 90 TABLET | Refills: 0 | Status: SHIPPED | OUTPATIENT
Start: 2024-06-06

## 2024-06-06 RX ORDER — CLOPIDOGREL BISULFATE 75 MG/1
75 TABLET ORAL DAILY
Qty: 90 TABLET | Refills: 0 | Status: SHIPPED | OUTPATIENT
Start: 2024-06-06

## 2024-06-06 RX ORDER — ATORVASTATIN CALCIUM 20 MG/1
20 TABLET, FILM COATED ORAL DAILY
Qty: 90 TABLET | Refills: 3 | Status: SHIPPED | OUTPATIENT
Start: 2024-06-06

## 2024-06-06 NOTE — PROGRESS NOTES
Cardiology Office Visit      Encounter Date:  06/06/2024    Patient ID:   Nikolay Lam is a 73 y.o. male.    Reason For Followup:  Coronary artery disease  Paroxysmal atrial fibrillation    Brief Clinical History:  Dear Josep Edwards MD    I had the pleasure of seeing Nikolay Lam today. As you are well aware, this is a 73 y.o. male with a history of ischemic heart disease and proximal atrial fibrillation. He has additional history of dyslipidemia and hypertension. He presents today for follow-up on the above conditions.     Interval History:  He denies any chest pain pressure heaviness or tightness.  He denies any shortness of breath.  He denies any PND orthopnea.      He does report 1 episode of syncope.  The episode occurred while his wife was cutting his hair.  He reports that he had a towel tightly wrapped around his neck and he was actually holding the towel with his fingers near his carotid arteries.  He reports that he looked down so that she could cut the hair on the back of his neck and he became lightheaded and actually blacked out.  His wife accompanies him today and notes that he became very diaphoretic.  She put cool water on him and he eventually came back around.  He denied any other symptoms and this has not occurred before or since.  Most likely, the patient had a vagal episode that may have been related to the tight towel and carotid sensitivity.    He did follow-up with CT surgery regarding his ascending aortic aneurysm.  They will continue with routine follow-ups annually.    06/06/2024        He denies any chest pain pressure heaviness or tightness.  He has no shortness of breath.  He reports feeling well from a cardiac perspective.    He reports that he quit working at the [employer] 1 May.  He is completely retired.  He is adjusting to this lifestyle.    Assessment & Plan     Impressions:  Coronary artery disease status post PCI of the ostial right Coronary artery with a  "drug-eluting stent in December 2015.  Paroxysmal AF with elevated Dell score.      Status post DC cardioversion in the past     Maintaining sinus rhythm without recurrence     Currently on full dose aspirin.  Hypertension with hypertensive cardiovascular disease  Ascending aortic aneurysm-5 cm CT scan August 2023     Followed by CT surgery serially  Obesity.  Dyslipidemia.  Hypertension-situational component noted  Hypertensive cardiovascular disease  Erectile dysfunction  Syncope likely related to carotid sinus sensitivity and vagal response.     Recommendations:  Continuation of his current cardiovascular regimen at the present time.     This includes antihypertensives, statin, and antiplatelet therapy  Follow-up with CT surgery as scheduled  Follow-up in 6 months time, sooner should there be difficulties    Diagnoses and all orders for this visit:    1. Chronic coronary artery disease (Primary)  -     clopidogrel (PLAVIX) 75 MG tablet; Take 1 tablet by mouth Daily.  Dispense: 90 tablet; Refill: 0  -     ECG 12 Lead    2. Primary hypertension  -     ECG 12 Lead    3. Hypertensive heart disease without heart failure  -     ECG 12 Lead    4. Dyslipidemia  -     atorvastatin (LIPITOR) 20 MG tablet; Take 1 tablet by mouth Daily.  Dispense: 90 tablet; Refill: 3  -     ECG 12 Lead    5. Essential hypertension  -     Edarbyclor 40-12.5 MG tablet; Take 1 tablet by mouth Daily.  Dispense: 90 tablet; Refill: 0  -     metoprolol succinate XL (TOPROL-XL) 50 MG 24 hr tablet; Take 1 tablet by mouth Daily.  Dispense: 90 tablet; Refill: 0  -     ECG 12 Lead    Other orders  -     sildenafil (VIAGRA) 25 MG tablet; Take 1 tablet by mouth Daily As Needed for Erectile Dysfunction.  Dispense: 20 tablet; Refill: 3              Objective:    Vitals:  Vitals:    06/06/24 1014   BP: 132/58   BP Location: Left arm   Patient Position: Sitting   Pulse: 57   SpO2: 98%   Weight: 120 kg (264 lb)   Height: 180.3 cm (71\")       Body mass index is " 36.82 kg/m².      Physical Exam:    General: Alert, cooperative, no distress, appears stated age  Head:  Normocephalic, atraumatic, mucous membranes moist  Eyes:  Conjunctiva/corneas clear, EOM's intact     Neck:  Supple,  no bruit    Lungs: Clear to auscultation bilaterally, no wheezes rhonchi rales are noted  Chest wall: No tenderness  Heart::  Regular rate and rhythm, S1 and S2 normal, 1/6 holosystolic murmur.  No rub or gallop  Abdomen: Soft, non-tender, nondistended bowel sounds active  Extremities: No cyanosis, clubbing, or edema  Pulses: 2+ and symmetric all extremities  Skin:  No rashes or lesions  Neuro/psych: A&O x3. CN II through XII are grossly intact with appropriate affect      Allergies:  No Known Allergies    Medication Review:     Current Outpatient Medications:     aspirin 325 MG tablet, ASPIRIN 325 MG TABS, Disp: , Rfl:     atorvastatin (LIPITOR) 20 MG tablet, Take 1 tablet by mouth Daily., Disp: 90 tablet, Rfl: 3    Biotin 1000 MCG tablet, Take 1,000 mcg by mouth 3 (Three) Times a Day., Disp: , Rfl:     clopidogrel (PLAVIX) 75 MG tablet, Take 1 tablet by mouth Daily., Disp: 90 tablet, Rfl: 0    Edarbyclor 40-12.5 MG tablet, Take 1 tablet by mouth Daily., Disp: 90 tablet, Rfl: 0    metoprolol succinate XL (TOPROL-XL) 50 MG 24 hr tablet, Take 1 tablet by mouth Daily., Disp: 90 tablet, Rfl: 0    Omega-3 Fatty Acids (fish oil) 1000 MG capsule capsule, FISH OIL 1000 MG CAPS, Disp: , Rfl:     Potassium 99 MG tablet, Take  by mouth., Disp: , Rfl:     sildenafil (VIAGRA) 25 MG tablet, Take 1 tablet by mouth Daily As Needed for Erectile Dysfunction., Disp: 20 tablet, Rfl: 3    vitamin D3 125 MCG (5000 UT) capsule capsule, Take 1 capsule by mouth Daily., Disp: , Rfl:     Family History:  Family History   Problem Relation Age of Onset    Colon cancer Mother     Parkinsonism Father        Past Medical History:  Past Medical History:   Diagnosis Date    Aneurysm     Atrial fibrillation     Coagulopathy      Coronary artery disease     Dyslipidemia     Hyperlipidemia     Hypertension     Hypertensive cardiovascular disease     Obesity        Past Surgical History:  Past Surgical History:   Procedure Laterality Date    CARDIAC CATHETERIZATION      CARDIOVERSION  2015    CAROTID STENT      CORONARY ANGIOPLASTY WITH STENT PLACEMENT  2015    Xience Alpine stent to ostial [facility]CA- [facility]    KNEE SURGERY Left 2012    SHOULDER SURGERY Left        Social History:  Social History     Socioeconomic History    Marital status:    Tobacco Use    Smoking status: Former     Current packs/day: 0.00     Average packs/day: 1.5 packs/day for 25.7 years (38.5 ttl pk-yrs)     Types: Cigarettes     Start date: 1966     Quit date: 1991     Years since quittin.4     Passive exposure: Past    Smokeless tobacco: Never   Vaping Use    Vaping status: Never Used   Substance and Sexual Activity    Alcohol use: Yes     Alcohol/week: 14.0 standard drinks of alcohol     Types: 14 Cans of beer per week    Drug use: No    Sexual activity: Yes     Partners: Female       Review of Systems:  The following systems were reviewed as they relate to the cardiovascular system: Constitutional, Eyes, ENT, Cardiovascular, Respiratory, Gastrointestinal, Integumentary, Neurological, Psychiatric, Hematologic, Endocrine, Musculoskeletal, and Genitourinary. The pertinent cardiovascular findings are reported above with all other cardiovascular points within those systems being negative.    Diagnostic Study Review:     Current Electrocardiogram:    ECG 12 Lead    Date/Time: 2024 5:28 PM  Performed by: Adán Harding DO    Authorized by: Adán Harding DO  Comparison: not compared with previous ECG   Previous ECG: no previous ECG available  Comments: Normal sinus rhythm with a ventricular rate of 54 bpm.  Borderline left axis deviation.  Normal QT and QTc intervals.          Laboratory  "Data:  Lab Results   Component Value Date    GLUCOSE 114 (H) 09/20/2019    BUN 19 09/20/2019    CREATININE 1.00 08/25/2023    EGFRIFNONA 74 09/20/2019    BCR 19.0 09/20/2019    K 4.4 09/20/2019    CO2 25.0 09/20/2019    CALCIUM 8.8 (L) 09/20/2019    ALBUMIN 3.80 09/20/2019    AST 21 09/20/2019    ALT 17 09/20/2019     Lab Results   Component Value Date    GLUCOSE 114 (H) 09/20/2019    CALCIUM 8.8 (L) 09/20/2019     09/20/2019    K 4.4 09/20/2019    CO2 25.0 09/20/2019     09/20/2019    BUN 19 09/20/2019    CREATININE 1.00 08/25/2023    EGFRIFNONA 74 09/20/2019    BCR 19.0 09/20/2019    ANIONGAP 12.4 09/20/2019     Lab Results   Component Value Date    WBC 5.60 09/20/2019    HGB 13.2 09/20/2019    HCT 38.3 09/20/2019    MCV 91.5 09/20/2019     09/20/2019     Lab Results   Component Value Date    CHOL 165 09/20/2019    TRIG 46 09/20/2019    HDL 55 09/20/2019     (H) 09/20/2019     No results found for: \"HGBA1C\"  No results found for: \"INR\", \"PROTIME\"    Most Recent Echo:  Results for orders placed during the hospital encounter of 08/25/23    Adult Transthoracic Echo Complete w/ Color, Spectral and Contrast if necessary per protocol    Interpretation Summary    Left ventricular systolic function is normal. Left ventricular ejection fraction appears to be 56 - 60%.    Left ventricular wall thickness is consistent with mild concentric hypertrophy.       Most Recent Stress Test:  Results for orders placed during the hospital encounter of 05/26/21    Stress Test With Myocardial Perfusion One Day    Interpretation Summary  · Myocardial perfusion imaging indicates a normal myocardial perfusion study with no evidence of ischemia.  · Left ventricular ejection fraction is normal. (Calculated EF = 60%).  · Impressions are consistent with a low risk study.       Most Recent Cardiac Catheterization:   No results found for this or any previous visit.       NOTE:     Today,06/06/2024 ,the following portions " "of the patient's note were reviewed, confirmed and/or updated as appropriate: History of present illness/Interval history, physical examination, assessment & plan, allergies, current medications, past family history, past medical history, past social history, past surgical history and problem list.    Labs pertinent to today's visit on 06/06/2024 (including but not limited to CBC, CMP, and lipid profiles) were requested from the patient's primary care provider/hospital/clinical laboratory.  If the labs were available for the visit, they were reviewed with the patient.  If they were not available, when received, special interest will be made to the labs pertinent to this visit.  The patient's most recent \"in-house\" labs are noted below and have been reviewed.  Outside labs pertinent to this visit are scanned into the record and have been reviewed.    Discussions held today, 06/06/2024,regarding procedures included risk, benefits, and options including but not limited to: Death, MI, stroke, pain, bleeding, infection, and possible need for vascular/thoracic/cardiothoracic surgery.    Copied information within this note was reviewed and is current as of 06/06/2024.    Assessment and plan noted herein represents the current plan of care as of 06/06/2024.    Significant resources from our office and staff are inherent in engaging this patient today,06/06/2024,and in a continuous and active collaborative plan of care related to their chronic cardiovascular conditions outlined herein.  The management of these conditions requires the direction of our service with specialized clinical knowledge, skills, and experience.  This collaborative care includes but is not limited to patient education, expectations and responsibilities, shared decision making around therapeutic goals, and shared commitments to achieve those goals.  "

## 2024-08-05 ENCOUNTER — TELEPHONE (OUTPATIENT)
Dept: CARDIAC SURGERY | Facility: CLINIC | Age: 74
End: 2024-08-05
Payer: MEDICARE

## 2024-08-05 DIAGNOSIS — I71.21 ANEURYSM OF ASCENDING AORTA WITHOUT RUPTURE: Primary | ICD-10-CM

## 2024-08-05 NOTE — TELEPHONE ENCOUNTER
Notified spouse that the order for patient's CTA chest has been placed. Provided spouse with the phone number for central scheduling.

## 2024-08-05 NOTE — TELEPHONE ENCOUNTER
Caller: ERIBERTO FARIAS    Relationship to patient: Emergency Contact    Best call back number: [phone]     Chief complaint: PATIENT IS HAVING SURGERY ON 8/28/24 AND WOULD LIKE TESTING DONE PRIOR IF AT ALL POSSIBLE.     Type of visit: CTA CHEST     Requested date: BEFORE 8/28/24     If rescheduling, when is the original appointment: NOT SCHEDULED.     Additional notes:PATIENT WOULD LIKE A CALL BACK.

## 2024-08-09 ENCOUNTER — HOSPITAL ENCOUNTER (OUTPATIENT)
Dept: CT IMAGING | Facility: HOSPITAL | Age: 74
Discharge: HOME | End: 2024-08-09
Payer: MEDICARE

## 2024-08-09 DIAGNOSIS — I71.21 ANEURYSM OF ASCENDING AORTA WITHOUT RUPTURE: ICD-10-CM

## 2024-08-09 PROCEDURE — 25510000001 IOPAMIDOL PER 1 ML

## 2024-08-09 PROCEDURE — 71275 CT ANGIOGRAPHY CHEST: CPT

## 2024-08-09 RX ADMIN — IOPAMIDOL 100 ML: 755 INJECTION, SOLUTION INTRAVENOUS at 12:11

## 2024-08-19 ENCOUNTER — TELEPHONE (OUTPATIENT)
Dept: CARDIOLOGY | Facility: CLINIC | Age: 74
End: 2024-08-19

## 2024-08-23 ENCOUNTER — TELEPHONE (OUTPATIENT)
Dept: CARDIOLOGY | Facility: CLINIC | Age: 74
End: 2024-08-23
Payer: MEDICARE

## 2024-08-23 DIAGNOSIS — E78.5 DYSLIPIDEMIA: ICD-10-CM

## 2024-08-23 NOTE — TELEPHONE ENCOUNTER
Caller: ERIBERTO FARIAS    Relationship: Emergency Contact    Best call back number: [phone]    Requested Prescriptions:   Requested Prescriptions     Pending Prescriptions Disp Refills    atorvastatin (LIPITOR) 20 MG tablet 90 tablet 3     Sig: Take 1 tablet by mouth Daily.        Pharmacy where request should be sent: [facility] [id number]  [place], [place] - [address] [place] [address] DR - [phone]  - [phone] FX     Last office visit with prescribing clinician: 6/6/2024   Last telemedicine visit with prescribing clinician: Visit date not found   Next office visit with prescribing clinician: 12/6/2024     Additional details provided by patient: PT'S WIFE SAID PT ONLY HAS 3 PILLS LEFT.     Does the patient have less than a 3 day supply:  [x] Yes  [] No    Would you like a call back once the refill request has been completed: [] Yes [x] No    If the office needs to give you a call back, can they leave a voicemail: [] Yes [x] No    [facility] Rep   08/23/24 09:08 EDT

## 2024-08-27 RX ORDER — ATORVASTATIN CALCIUM 20 MG/1
20 TABLET, FILM COATED ORAL DAILY
Qty: 90 TABLET | Refills: 0 | Status: SHIPPED | OUTPATIENT
Start: 2024-08-27

## 2024-09-18 ENCOUNTER — OFFICE VISIT (OUTPATIENT)
Dept: CARDIAC SURGERY | Facility: CLINIC | Age: 74
End: 2024-09-18
Payer: MEDICARE

## 2024-09-18 VITALS
HEART RATE: 66 BPM | SYSTOLIC BLOOD PRESSURE: 130 MMHG | TEMPERATURE: 97.1 F | BODY MASS INDEX: 35.21 KG/M2 | OXYGEN SATURATION: 97 % | DIASTOLIC BLOOD PRESSURE: 54 MMHG | WEIGHT: 260 LBS | RESPIRATION RATE: 20 BRPM | HEIGHT: 72 IN

## 2024-09-18 DIAGNOSIS — I71.21 ANEURYSM OF ASCENDING AORTA WITHOUT RUPTURE: Primary | ICD-10-CM

## 2024-09-18 PROCEDURE — 3078F DIAST BP <80 MM HG: CPT

## 2024-09-18 PROCEDURE — 3075F SYST BP GE 130 - 139MM HG: CPT

## 2024-09-18 PROCEDURE — 99214 OFFICE O/P EST MOD 30 MIN: CPT

## 2024-09-30 NOTE — PROGRESS NOTES
9/30/2024      Subjective:      Josep Pickett MD    Chief Complaint: Follow-up ascending aortic aneurysm    History of Present Illness:       Dear Josep Edwards MD and Colleagues,    It was nice to see Nikolay Lam in consultation at your request. He is a 73 y.o. male with hypertension, hyperlipidemia, coagulopathy, coronary disease, atrial fibrillation, who had a CT of the chest for follow-up.  The CT of chest on 8/9/2024 was reviewed and the ascending aorta measures maximally at 4.8 cm.  He denies shortness of breath, chest/back pain, numbness/tingling/pain of extremities.  No vascular deficiencies or hyperextensible or hypermobile joints are noted on exam.      Social history:      Patient Active Problem List   Diagnosis    Atrial fibrillation    Chronic coronary artery disease    Coagulopathy    Dyslipidemia    Hypertension    Hypertensive cardiovascular disease    Obesity    Ear pain, right    Branch retinal vein occlusion with macular edema    Combined form of senile cataract    Elevated bilirubin    Epiretinal membrane    Vitreous degeneration    Erectile dysfunction    Right knee pain    Ascending aortic aneurysm       Past Medical History:   Diagnosis Date    Aneurysm     Atrial fibrillation     Coagulopathy     Coronary artery disease     Dyslipidemia     Hyperlipidemia     Hypertension     Hypertensive cardiovascular disease     Obesity        Past Surgical History:   Procedure Laterality Date    CARDIAC CATHETERIZATION      CARDIOVERSION  12/2015    CAROTID STENT      CORONARY ANGIOPLASTY WITH STENT PLACEMENT  12/18/2015    Xience Alpine stent to ostial RCA- [facility]    KNEE SURGERY Left 01/04/2012    SHOULDER SURGERY Left        No Known Allergies      Current Outpatient Medications:     aspirin 325 MG tablet, ASPIRIN 325 MG TABS, Disp: , Rfl:     atorvastatin (LIPITOR) 20 MG tablet, Take 1 tablet by mouth Daily., Disp: 90 tablet, Rfl: 0    Biotin 1000 MCG tablet, Take  1,000 mcg by mouth 3 (Three) Times a Day., Disp: , Rfl:     clopidogrel (PLAVIX) 75 MG tablet, Take 1 tablet by mouth Daily., Disp: 90 tablet, Rfl: 0    Edarbyclor 40-12.5 MG tablet, Take 1 tablet by mouth Daily., Disp: 90 tablet, Rfl: 0    metoprolol succinate XL (TOPROL-XL) 50 MG 24 hr tablet, Take 1 tablet by mouth Daily., Disp: 90 tablet, Rfl: 0    Omega-3 Fatty Acids (fish oil) 1000 MG capsule capsule, FISH OIL 1000 MG CAPS, Disp: , Rfl:     Potassium 99 MG tablet, Take  by mouth., Disp: , Rfl:     sildenafil (VIAGRA) 25 MG tablet, Take 1 tablet by mouth Daily As Needed for Erectile Dysfunction., Disp: 20 tablet, Rfl: 3    vitamin D3 125 MCG (5000 UT) capsule capsule, Take 1 capsule by mouth Daily., Disp: , Rfl:     Social History     Socioeconomic History    Marital status:    Tobacco Use    Smoking status: Former     Current packs/day: 0.00     Average packs/day: 1.5 packs/day for 25.7 years (38.5 ttl pk-yrs)     Types: Cigarettes     Start date: 1966     Quit date: 1991     Years since quittin.7     Passive exposure: Past    Smokeless tobacco: Never   Vaping Use    Vaping status: Never Used   Substance and Sexual Activity    Alcohol use: Yes     Alcohol/week: 14.0 standard drinks of alcohol     Types: 14 Cans of beer per week    Drug use: No    Sexual activity: Yes     Partners: Female       Family History   Problem Relation Age of Onset    Colon cancer Mother     Parkinsonism Father        The following portions of the patient's history were reviewed and updated as appropriate: He  has a past medical history of Aneurysm, Atrial fibrillation, Coagulopathy, Coronary artery disease, Dyslipidemia, Hyperlipidemia, Hypertension, Hypertensive cardiovascular disease, and Obesity.  He does not have any pertinent problems on file.  He  has a past surgical history that includes Cardioversion (2015); Coronary angioplasty with stent (2015); Cardiac catheterization; Knee surgery (Left,  01/04/2012); Shoulder surgery (Left); and Carotid stent.  His family history includes Colon cancer in his mother; Parkinsonism in his father.  He  reports that he quit smoking about 32 years ago. His smoking use included cigarettes. He started smoking about 58 years ago. He has a 38.5 pack-year smoking history. He has been exposed to tobacco smoke. He has never used smokeless tobacco. He reports current alcohol use of about 14.0 standard drinks of alcohol per week. He reports that he does not use drugs.  Current Outpatient Medications   Medication Sig Dispense Refill    aspirin 325 MG tablet ASPIRIN 325 MG TABS      atorvastatin (LIPITOR) 20 MG tablet Take 1 tablet by mouth Daily. 90 tablet 0    Biotin 1000 MCG tablet Take 1,000 mcg by mouth 3 (Three) Times a Day.      clopidogrel (PLAVIX) 75 MG tablet Take 1 tablet by mouth Daily. 90 tablet 0    Edarbyclor 40-12.5 MG tablet Take 1 tablet by mouth Daily. 90 tablet 0    metoprolol succinate XL (TOPROL-XL) 50 MG 24 hr tablet Take 1 tablet by mouth Daily. 90 tablet 0    Omega-3 Fatty Acids (fish oil) 1000 MG capsule capsule FISH OIL 1000 MG CAPS      Potassium 99 MG tablet Take  by mouth.      sildenafil (VIAGRA) 25 MG tablet Take 1 tablet by mouth Daily As Needed for Erectile Dysfunction. 20 tablet 3    vitamin D3 125 MCG (5000 UT) capsule capsule Take 1 capsule by mouth Daily.       No current facility-administered medications for this visit.     Current Outpatient Medications on File Prior to Visit   Medication Sig    aspirin 325 MG tablet ASPIRIN 325 MG TABS    atorvastatin (LIPITOR) 20 MG tablet Take 1 tablet by mouth Daily.    Biotin 1000 MCG tablet Take 1,000 mcg by mouth 3 (Three) Times a Day.    clopidogrel (PLAVIX) 75 MG tablet Take 1 tablet by mouth Daily.    Edarbyclor 40-12.5 MG tablet Take 1 tablet by mouth Daily.    metoprolol succinate XL (TOPROL-XL) 50 MG 24 hr tablet Take 1 tablet by mouth Daily.    Omega-3 Fatty Acids (fish oil) 1000 MG capsule  capsule FISH OIL 1000 MG CAPS    Potassium 99 MG tablet Take  by mouth.    sildenafil (VIAGRA) 25 MG tablet Take 1 tablet by mouth Daily As Needed for Erectile Dysfunction.    vitamin D3 125 MCG (5000 UT) capsule capsule Take 1 capsule by mouth Daily.     No current facility-administered medications on file prior to visit.     He has No Known Allergies..    Review of Systems:  Review of Systems    Physical Exam:    Vital Signs:  Weight: 118 kg (260 lb)   Body mass index is 35.26 kg/m².  Temp: 97.1 °F (36.2 °C)   Heart Rate: 66   BP: 130/54     Physical Exam     Assessment:     -Ascending aortic aneurysm 4.8 cm.  Follow-up in 1 year    Recommendation/Plan:     We discussed the importance of avoidance of over the counter decongestants and stimulants, specifically pseudoephedrine, for hypertension and aneurysm management    Risk factor modification of hypertension with a goal blood pressure less than 130/80, hyperlipidemia optimization, and continued avoidance of tobacco/nicotine products.    Initiation of low aerobic exercise is indicated to help reduce body habitus, assist with blood pressure and cholesterol control.       Although risk of rupture is low, emergency department presentation is warranted for acute chest, back, or abdominal pain, syncope, or stroke like symptoms    Thank you for allowing us to participate in his care.    Regards,    Bar Moreland MD    ** all problems new to this practitioner, extensive review of records prior and during patient interview, >30 minutes in face to face conversation regarding treatment plan, education, and answering any questions the patient may have had

## 2024-10-01 DIAGNOSIS — I10 ESSENTIAL HYPERTENSION: ICD-10-CM

## 2024-10-01 RX ORDER — AZILSARTAN KAMEDOXOMIL AND CHLORTHALIDONE 40; 12.5 MG/1; MG/1
1 TABLET ORAL DAILY
Qty: 90 TABLET | Refills: 0 | Status: SHIPPED | OUTPATIENT
Start: 2024-10-01

## 2024-10-09 DIAGNOSIS — I71.21 ANEURYSM OF ASCENDING AORTA WITHOUT RUPTURE: Primary | ICD-10-CM

## 2024-11-19 DIAGNOSIS — I25.10 CHRONIC CORONARY ARTERY DISEASE: ICD-10-CM

## 2024-11-19 DIAGNOSIS — I10 ESSENTIAL HYPERTENSION: ICD-10-CM

## 2024-11-19 RX ORDER — METOPROLOL SUCCINATE 50 MG/1
50 TABLET, EXTENDED RELEASE ORAL DAILY
Qty: 90 TABLET | Refills: 1 | Status: SHIPPED | OUTPATIENT
Start: 2024-11-19

## 2024-11-19 RX ORDER — CLOPIDOGREL BISULFATE 75 MG/1
75 TABLET ORAL DAILY
Qty: 90 TABLET | Refills: 1 | Status: SHIPPED | OUTPATIENT
Start: 2024-11-19

## 2024-12-05 NOTE — PROGRESS NOTES
Cardiology Office Visit      Encounter Date:  12/06/2024    Patient ID:   Nikolay Lam is a 74 y.o. male.    Reason For Followup:  Coronary artery disease  Paroxysmal atrial fibrillation    Brief Clinical History:  Dear Josep Edwards MD    I had the pleasure of seeing Nikolay Lam today. As you are well aware, this is a 74 y.o. male with a history of ischemic heart disease and proximal atrial fibrillation. He has additional history of dyslipidemia and hypertension. He presents today for follow-up on the above conditions.     Interval History:  He denies any chest pain pressure heaviness or tightness.  He denies any shortness of breath.  He denies any PND orthopnea.      He does report 1 episode of syncope.  The episode occurred while his wife was cutting his hair.  He reports that he had a towel tightly wrapped around his neck and he was actually holding the towel with his fingers near his carotid arteries.  He reports that he looked down so that she could cut the hair on the back of his neck and he became lightheaded and actually blacked out.  His wife accompanies him today and notes that he became very diaphoretic.  She put cool water on him and he eventually came back around.  He denied any other symptoms and this has not occurred before or since.  Most likely, the patient had a vagal episode that may have been related to the tight towel and carotid sensitivity.    He did follow-up with CT surgery regarding his ascending aortic aneurysm.  They will continue with routine follow-ups annually.    06/06/2024        He denies any chest pain pressure heaviness or tightness.  He has no shortness of breath.  He reports feeling well from a cardiac perspective.    He reports that he quit working at the [employer] 1 May.  He is completely retired.  He is adjusting to this lifestyle.    12/06/2024        He reports doing well.  No chest pain pressure heaviness or tightness.  No shortness of breath.  No new  "issues to report.    Assessment & Plan     Impressions:  Coronary artery disease status post PCI of the ostial right Coronary artery with a drug-eluting stent in December 2015.  Paroxysmal AF with elevated Dell score.      Status post DC cardioversion in the past     Maintaining sinus rhythm without recurrence     Currently on full dose aspirin.  Hypertension with hypertensive cardiovascular disease  Ascending aortic aneurysm-5 cm CT scan August 2023     Followed by CT surgery serially  Obesity.  Dyslipidemia.  Hypertension-situational component noted  Hypertensive cardiovascular disease  Erectile dysfunction  Syncope likely related to carotid sinus sensitivity and vagal response.     Recommendations:  Continuation of his current cardiovascular regimen at the present time.     This includes antihypertensives, statin, and antiplatelet therapy  Follow-up with CT surgery as scheduled  Follow-up in 6 months time, sooner should there be difficulties    Diagnoses and all orders for this visit:    1. Chronic coronary artery disease (Primary)    2. Primary hypertension    3. Hypertensive heart disease without heart failure    4. Coagulopathy    5. Paroxysmal atrial fibrillation                Objective:    Vitals:  Vitals:    12/06/24 1019   BP: 130/70   BP Location: Left arm   Patient Position: Sitting   Cuff Size: Adult   Pulse: 64   SpO2: 97%   Weight: 95.3 kg (210 lb)   Height: 182.9 cm (72\")         Body mass index is 28.48 kg/m².      Physical Exam:    General: Alert, cooperative, no distress, appears stated age  Head:  Normocephalic, atraumatic, mucous membranes moist  Eyes:  Conjunctiva/corneas clear, EOM's intact     Neck:  Supple,  no bruit    Lungs: Clear to auscultation bilaterally, no wheezes rhonchi rales are noted  Chest wall: No tenderness  Heart::  Regular rate and rhythm, S1 and S2 normal, 1/6 holosystolic murmur.  No rub or gallop  Abdomen: Soft, non-tender, nondistended bowel sounds " active  Extremities: No cyanosis, clubbing, or edema  Pulses: 2+ and symmetric all extremities  Skin:  No rashes or lesions  Neuro/psych: A&O x3. CN II through XII are grossly intact with appropriate affect      Allergies:  No Known Allergies    Medication Review:     Current Outpatient Medications:     aspirin 325 MG tablet, Take 1 tablet by mouth Daily., Disp: , Rfl:     atorvastatin (LIPITOR) 20 MG tablet, Take 1 tablet by mouth Daily., Disp: 90 tablet, Rfl: 0    Biotin 1000 MCG tablet, Take 1,000 mcg by mouth 3 (Three) Times a Day., Disp: , Rfl:     clopidogrel (PLAVIX) 75 MG tablet, Take 1 tablet by mouth Daily., Disp: 90 tablet, Rfl: 1    metoprolol succinate XL (TOPROL-XL) 50 MG 24 hr tablet, Take 1 tablet by mouth Daily., Disp: 90 tablet, Rfl: 1    Omega-3 Fatty Acids (fish oil) 1000 MG capsule capsule, FISH OIL 1000 MG CAPS, Disp: , Rfl:     Potassium 99 MG tablet, Take  by mouth., Disp: , Rfl:     sildenafil (VIAGRA) 25 MG tablet, Take 1 tablet by mouth Daily As Needed for Erectile Dysfunction., Disp: 20 tablet, Rfl: 3    vitamin D3 125 MCG (5000 UT) capsule capsule, Take 1 capsule by mouth Daily., Disp: , Rfl:     Edarbyclor 40-12.5 MG tablet, TAKE 1 TABLET BY MOUTH ONCE DAILY, Disp: 90 tablet, Rfl: 0    Family History:  Family History   Problem Relation Age of Onset    Colon cancer Mother     Parkinsonism Father        Past Medical History:  Past Medical History:   Diagnosis Date    Aneurysm     Atrial fibrillation     Coagulopathy     Coronary artery disease     Dyslipidemia     Hyperlipidemia     Hypertension     Hypertensive cardiovascular disease     Obesity        Past Surgical History:  Past Surgical History:   Procedure Laterality Date    CARDIAC CATHETERIZATION      CARDIOVERSION  12/2015    CAROTID STENT      CORONARY ANGIOPLASTY WITH STENT PLACEMENT  12/18/2015    Xience Alpine stent to ostial RCA- [facility]    KNEE SURGERY Left 01/04/2012    SHOULDER SURGERY Left        Social  History:  Social History     Socioeconomic History    Marital status:    Tobacco Use    Smoking status: Former     Current packs/day: 0.00     Average packs/day: 1.5 packs/day for 25.7 years (38.5 ttl pk-yrs)     Types: Cigarettes     Start date: 1966     Quit date: 1991     Years since quittin.0     Passive exposure: Past    Smokeless tobacco: Never   Vaping Use    Vaping status: Never Used   Substance and Sexual Activity    Alcohol use: Yes     Alcohol/week: 14.0 standard drinks of alcohol     Types: 14 Cans of beer per week    Drug use: No    Sexual activity: Yes     Partners: Female       Review of Systems:  The following systems were reviewed as they relate to the cardiovascular system: Constitutional, Eyes, ENT, Cardiovascular, Respiratory, Gastrointestinal, Integumentary, Neurological, Psychiatric, Hematologic, Endocrine, Musculoskeletal, and Genitourinary. The pertinent cardiovascular findings are reported above with all other cardiovascular points within those systems being negative.    Diagnostic Study Review:     Current Electrocardiogram:  Procedures sinus bradycardia with a ventricular rate of 47 bpm.  Ventricular premature complex.  Incomplete right bundle branch block.  Prolonged QT and normal QTc intervals of 468 and 414 ms respectively.    Laboratory Data:  Lab Results   Component Value Date    GLUCOSE 114 (H) 2019    BUN 19 2019    CREATININE 1.00 2023    EGFRIFNONA 74 2019    BCR 19.0 2019    K 4.4 2019    CO2 25.0 2019    CALCIUM 8.8 (L) 2019    ALBUMIN 3.80 2019    AST 21 2019    ALT 17 2019     Lab Results   Component Value Date    GLUCOSE 114 (H) 2019    CALCIUM 8.8 (L) 2019     2019    K 4.4 2019    CO2 25.0 2019     2019    BUN 19 2019    CREATININE 1.00 2023    EGFRIFNONA 74 2019    BCR 19.0 2019    ANIONGAP 12.4 2019     Lab  "Results   Component Value Date    WBC 5.60 09/20/2019    HGB 13.2 09/20/2019    HCT 38.3 09/20/2019    MCV 91.5 09/20/2019     09/20/2019     Lab Results   Component Value Date    CHOL 165 09/20/2019    TRIG 46 09/20/2019    HDL 55 09/20/2019     (H) 09/20/2019     No results found for: \"HGBA1C\"  No results found for: \"INR\", \"PROTIME\"    Most Recent Echo:  Results for orders placed during the hospital encounter of 08/25/23    Adult Transthoracic Echo Complete w/ Color, Spectral and Contrast if necessary per protocol    Interpretation Summary    Left ventricular systolic function is normal. Left ventricular ejection fraction appears to be 56 - 60%.    Left ventricular wall thickness is consistent with mild concentric hypertrophy.       Most Recent Stress Test:  Results for orders placed during the hospital encounter of 05/26/21    Stress Test With Myocardial Perfusion One Day    Interpretation Summary  · Myocardial perfusion imaging indicates a normal myocardial perfusion study with no evidence of ischemia.  · Left ventricular ejection fraction is normal. (Calculated EF = 60%).  · Impressions are consistent with a low risk study.       Most Recent Cardiac Catheterization:   No results found for this or any previous visit.       NOTE:     Today,12/06/2024 ,the following portions of the patient's note were reviewed, confirmed and/or updated as appropriate: History of present illness/Interval history, physical examination, assessment & plan, allergies, current medications, past family history, past medical history, past social history, past surgical history and problem list.    Labs pertinent to today's visit on 12/06/2024 (including but not limited to CBC, CMP, and lipid profiles) were requested from the patient's primary care provider/hospital/clinical laboratory.  If the labs were available for the visit, they were reviewed with the patient.  If they were not available, when received, special interest " "will be made to the labs pertinent to this visit.  The patient's most recent \"in-house\" labs are noted below and have been reviewed.  Outside labs pertinent to this visit are scanned into the record and have been reviewed.    Discussions held today, 12/06/2024,regarding procedures included risk, benefits, and options including but not limited to: Death, MI, stroke, pain, bleeding, infection, and possible need for vascular/thoracic/cardiothoracic surgery.    Copied information within this note was reviewed and is current as of 12/06/2024.    Assessment and plan noted herein represents the current plan of care as of 12/06/2024.    Significant resources from our office and staff are inherent in engaging this patient today,12/06/2024,and in a continuous and active collaborative plan of care related to their chronic cardiovascular conditions outlined herein.  The management of these conditions requires the direction of our service with specialized clinical knowledge, skills, and experience.  This collaborative care includes but is not limited to patient education, expectations and responsibilities, shared decision making around therapeutic goals, and shared commitments to achieve those goals.  "

## 2024-12-06 ENCOUNTER — OFFICE VISIT (OUTPATIENT)
Dept: CARDIOLOGY | Facility: CLINIC | Age: 74
End: 2024-12-06
Payer: MEDICARE

## 2024-12-06 VITALS
SYSTOLIC BLOOD PRESSURE: 130 MMHG | WEIGHT: 210 LBS | HEART RATE: 64 BPM | OXYGEN SATURATION: 97 % | DIASTOLIC BLOOD PRESSURE: 70 MMHG | BODY MASS INDEX: 28.44 KG/M2 | HEIGHT: 72 IN

## 2024-12-06 DIAGNOSIS — I11.9 HYPERTENSIVE HEART DISEASE WITHOUT HEART FAILURE: ICD-10-CM

## 2024-12-06 DIAGNOSIS — D68.9 COAGULOPATHY: ICD-10-CM

## 2024-12-06 DIAGNOSIS — I25.10 CHRONIC CORONARY ARTERY DISEASE: Primary | ICD-10-CM

## 2024-12-06 DIAGNOSIS — I10 PRIMARY HYPERTENSION: ICD-10-CM

## 2024-12-06 DIAGNOSIS — I48.0 PAROXYSMAL ATRIAL FIBRILLATION: ICD-10-CM

## 2024-12-30 DIAGNOSIS — I10 ESSENTIAL HYPERTENSION: ICD-10-CM

## 2024-12-30 RX ORDER — AZILSARTAN KAMEDOXOMIL AND CHLORTHALIDONE 40; 12.5 MG/1; MG/1
1 TABLET ORAL DAILY
Qty: 90 TABLET | Refills: 0 | Status: SHIPPED | OUTPATIENT
Start: 2024-12-30

## 2025-01-29 PROBLEM — K63.5 POLYP OF COLON: Status: ACTIVE | Noted: 2020-07-10

## 2025-04-01 DIAGNOSIS — I10 ESSENTIAL HYPERTENSION: ICD-10-CM

## 2025-04-01 RX ORDER — AZILSARTAN KAMEDOXOMIL AND CHLORTHALIDONE 40; 12.5 MG/1; MG/1
1 TABLET ORAL DAILY
Qty: 90 TABLET | Refills: 0 | Status: SHIPPED | OUTPATIENT
Start: 2025-04-01

## 2025-05-16 DIAGNOSIS — I25.10 CHRONIC CORONARY ARTERY DISEASE: ICD-10-CM

## 2025-05-16 DIAGNOSIS — I10 ESSENTIAL HYPERTENSION: ICD-10-CM

## 2025-05-16 RX ORDER — METOPROLOL SUCCINATE 50 MG/1
50 TABLET, EXTENDED RELEASE ORAL DAILY
Qty: 90 TABLET | Refills: 1 | Status: SHIPPED | OUTPATIENT
Start: 2025-05-16

## 2025-05-16 RX ORDER — CLOPIDOGREL BISULFATE 75 MG/1
75 TABLET ORAL DAILY
Qty: 90 TABLET | Refills: 1 | Status: SHIPPED | OUTPATIENT
Start: 2025-05-16

## 2025-06-24 DIAGNOSIS — I10 ESSENTIAL HYPERTENSION: ICD-10-CM

## 2025-06-24 RX ORDER — AZILSARTAN KAMEDOXOMIL AND CHLORTHALIDONE 40; 12.5 MG/1; MG/1
1 TABLET ORAL DAILY
Qty: 90 TABLET | Refills: 0 | Status: SHIPPED | OUTPATIENT
Start: 2025-06-24

## 2025-07-09 ENCOUNTER — TELEPHONE (OUTPATIENT)
Dept: CARDIOLOGY | Facility: CLINIC | Age: 75
End: 2025-07-09
Payer: MEDICARE

## 2025-07-09 NOTE — TELEPHONE ENCOUNTER
Caller: [employer]    Relationship to patient:     Best call back number: [phone]    Patient is needing: [employer] REQUESTING BLOOD THINNER CLEARANCE IN PATIENTS CHART BE COMPLETED AND FAXED BACK [phone]. PATIENTS SCHEDULED FOR [employer] FOR 7.17.25

## 2025-07-17 ENCOUNTER — OFFICE (AMBULATORY)
Dept: URBAN - METROPOLITAN AREA PATHOLOGY 19 | Facility: PATHOLOGY | Age: 75
End: 2025-07-17
Payer: MEDICARE

## 2025-07-17 ENCOUNTER — OFFICE (AMBULATORY)
Dept: URBAN - METROPOLITAN AREA PATHOLOGY 19 | Facility: PATHOLOGY | Age: 75
End: 2025-07-17
Payer: COMMERCIAL

## 2025-07-17 ENCOUNTER — ON CAMPUS - OUTPATIENT (AMBULATORY)
Dept: URBAN - METROPOLITAN AREA HOSPITAL 2 | Facility: HOSPITAL | Age: 75
End: 2025-07-17
Payer: MEDICARE

## 2025-07-17 VITALS
DIASTOLIC BLOOD PRESSURE: 63 MMHG | DIASTOLIC BLOOD PRESSURE: 70 MMHG | DIASTOLIC BLOOD PRESSURE: 60 MMHG | OXYGEN SATURATION: 98 % | RESPIRATION RATE: 15 BRPM | DIASTOLIC BLOOD PRESSURE: 66 MMHG | SYSTOLIC BLOOD PRESSURE: 99 MMHG | HEART RATE: 71 BPM | WEIGHT: 258 LBS | HEART RATE: 78 BPM | RESPIRATION RATE: 16 BRPM | DIASTOLIC BLOOD PRESSURE: 48 MMHG | SYSTOLIC BLOOD PRESSURE: 89 MMHG | SYSTOLIC BLOOD PRESSURE: 109 MMHG | SYSTOLIC BLOOD PRESSURE: 150 MMHG | HEART RATE: 72 BPM | OXYGEN SATURATION: 97 % | RESPIRATION RATE: 17 BRPM | SYSTOLIC BLOOD PRESSURE: 114 MMHG | TEMPERATURE: 98.3 F | HEART RATE: 70 BPM | HEART RATE: 76 BPM | DIASTOLIC BLOOD PRESSURE: 128 MMHG | DIASTOLIC BLOOD PRESSURE: 57 MMHG | OXYGEN SATURATION: 99 % | SYSTOLIC BLOOD PRESSURE: 92 MMHG | SYSTOLIC BLOOD PRESSURE: 104 MMHG | DIASTOLIC BLOOD PRESSURE: 65 MMHG | SYSTOLIC BLOOD PRESSURE: 121 MMHG | HEIGHT: 72 IN

## 2025-07-17 DIAGNOSIS — D12.4 BENIGN NEOPLASM OF DESCENDING COLON: ICD-10-CM

## 2025-07-17 DIAGNOSIS — D12.2 BENIGN NEOPLASM OF ASCENDING COLON: ICD-10-CM

## 2025-07-17 DIAGNOSIS — D12.3 BENIGN NEOPLASM OF TRANSVERSE COLON: ICD-10-CM

## 2025-07-17 DIAGNOSIS — Z86.0101 PERSONAL HISTORY OF ADENOMATOUS AND SERRATED COLON POLYPS: ICD-10-CM

## 2025-07-17 DIAGNOSIS — Z09 ENCOUNTER FOR FOLLOW-UP EXAMINATION AFTER COMPLETED TREATMEN: ICD-10-CM

## 2025-07-17 DIAGNOSIS — K62.89 OTHER SPECIFIED DISEASES OF ANUS AND RECTUM: ICD-10-CM

## 2025-07-17 PROBLEM — Z86.010 SURVEILLANCE DUE TO PRIOR COLONIC NEOPLASIA: Status: ACTIVE | Noted: 2025-07-17

## 2025-07-17 LAB
GI HISTOLOGY: A. ASCENDING COLON: (no result)
GI HISTOLOGY: B. TRANSVERSE COLON: (no result)
GI HISTOLOGY: C. DESCENDING COLON: (no result)
GI HISTOLOGY: PDF REPORT: (no result)

## 2025-07-17 PROCEDURE — 45385 COLONOSCOPY W/LESION REMOVAL: CPT | Mod: PT | Performed by: INTERNAL MEDICINE

## 2025-07-17 PROCEDURE — 88305 TISSUE EXAM BY PATHOLOGIST: CPT | Mod: 26,Q6 | Performed by: PATHOLOGY

## 2025-07-17 PROCEDURE — 45380 COLONOSCOPY AND BIOPSY: CPT | Mod: 59,PT | Performed by: INTERNAL MEDICINE

## 2025-07-29 ENCOUNTER — OFFICE VISIT (OUTPATIENT)
Dept: CARDIOLOGY | Facility: CLINIC | Age: 75
End: 2025-07-29
Payer: MEDICARE

## 2025-07-29 VITALS
HEIGHT: 72 IN | BODY MASS INDEX: 36.16 KG/M2 | OXYGEN SATURATION: 97 % | HEART RATE: 71 BPM | SYSTOLIC BLOOD PRESSURE: 113 MMHG | RESPIRATION RATE: 16 BRPM | DIASTOLIC BLOOD PRESSURE: 68 MMHG | WEIGHT: 267 LBS

## 2025-07-29 DIAGNOSIS — Z79.02 LONG TERM (CURRENT) USE OF ANTITHROMBOTICS/ANTIPLATELETS: ICD-10-CM

## 2025-07-29 DIAGNOSIS — I10 PRIMARY HYPERTENSION: ICD-10-CM

## 2025-07-29 DIAGNOSIS — E78.5 DYSLIPIDEMIA: ICD-10-CM

## 2025-07-29 DIAGNOSIS — I10 ESSENTIAL HYPERTENSION: ICD-10-CM

## 2025-07-29 DIAGNOSIS — I25.10 CHRONIC CORONARY ARTERY DISEASE: Primary | ICD-10-CM

## 2025-07-29 DIAGNOSIS — I11.9 HYPERTENSIVE HEART DISEASE WITHOUT HEART FAILURE: ICD-10-CM

## 2025-07-29 PROCEDURE — 1160F RVW MEDS BY RX/DR IN RCRD: CPT | Performed by: INTERNAL MEDICINE

## 2025-07-29 PROCEDURE — 3078F DIAST BP <80 MM HG: CPT | Performed by: INTERNAL MEDICINE

## 2025-07-29 PROCEDURE — 3074F SYST BP LT 130 MM HG: CPT | Performed by: INTERNAL MEDICINE

## 2025-07-29 PROCEDURE — G2211 COMPLEX E/M VISIT ADD ON: HCPCS | Performed by: INTERNAL MEDICINE

## 2025-07-29 PROCEDURE — 1159F MED LIST DOCD IN RCRD: CPT | Performed by: INTERNAL MEDICINE

## 2025-07-29 PROCEDURE — 99214 OFFICE O/P EST MOD 30 MIN: CPT | Performed by: INTERNAL MEDICINE

## 2025-07-29 RX ORDER — ATORVASTATIN CALCIUM 20 MG/1
20 TABLET, FILM COATED ORAL DAILY
Qty: 90 TABLET | Refills: 3 | Status: SHIPPED | OUTPATIENT
Start: 2025-07-29

## 2025-07-29 RX ORDER — SILDENAFIL 25 MG/1
25 TABLET, FILM COATED ORAL DAILY PRN
Qty: 20 TABLET | Refills: 3 | Status: SHIPPED | OUTPATIENT
Start: 2025-07-29

## 2025-07-29 RX ORDER — CLOPIDOGREL BISULFATE 75 MG/1
75 TABLET ORAL DAILY
Qty: 90 TABLET | Refills: 3 | Status: SHIPPED | OUTPATIENT
Start: 2025-07-29

## 2025-07-29 RX ORDER — METOPROLOL SUCCINATE 50 MG/1
50 TABLET, EXTENDED RELEASE ORAL DAILY
Qty: 90 TABLET | Refills: 3 | Status: SHIPPED | OUTPATIENT
Start: 2025-07-29

## 2025-07-29 NOTE — PROGRESS NOTES
Cardiology Office Visit      Encounter Date:  07/29/2025    Patient ID:   Nikolay Lam is a 74 y.o. male.    Reason For Followup:  Coronary artery disease  Paroxysmal atrial fibrillation    Brief Clinical History:  Dear Josep Edwards MD    I had the pleasure of seeing Nikolay Lam today. As you are well aware, this is a 74 y.o. male with a history of ischemic heart disease and proximal atrial fibrillation. He has additional history of dyslipidemia and hypertension. He presents today for follow-up on the above conditions.     Interval History:  He denies any chest pain pressure heaviness or tightness.  He denies any shortness of breath.  He denies any PND orthopnea.      He does report 1 episode of syncope.  The episode occurred while his wife was cutting his hair.  He reports that he had a towel tightly wrapped around his neck and he was actually holding the towel with his fingers near his carotid arteries.  He reports that he looked down so that she could cut the hair on the back of his neck and he became lightheaded and actually blacked out.  His wife accompanies him today and notes that he became very diaphoretic.  She put cool water on him and he eventually came back around.  He denied any other symptoms and this has not occurred before or since.  Most likely, the patient had a vagal episode that may have been related to the tight towel and carotid sensitivity.    He did follow-up with CT surgery regarding his ascending aortic aneurysm.  They will continue with routine follow-ups annually.    06/06/2024        He denies any chest pain pressure heaviness or tightness.  He has no shortness of breath.  He reports feeling well from a cardiac perspective.    He reports that he quit working at the [employer] 1 May.  He is completely retired.  He is adjusting to this lifestyle.    12/06/2024        He reports doing well.  No chest pain pressure heaviness or tightness.  No shortness of breath.  No new  issues to report.    07/29/2025        He reports doing well today.  No chest pain pressure heaviness or tightness.  No shortness of breath.  No palpitations.  He and his wife are planning to go to [place] for a month this winter.    Assessment & Plan     Impressions:  Coronary artery disease status post PCI of the ostial right Coronary artery with a drug-eluting stent in December 2015.  Paroxysmal AF with elevated Dell score.      Status post DC cardioversion in the past     Maintaining sinus rhythm without recurrence     Currently on full dose aspirin.  Hypertension with hypertensive cardiovascular disease  Ascending aortic aneurysm-5 cm CT scan August 2023     Followed by CT surgery serially  Obesity.  Dyslipidemia.  Hypertension-situational component noted  Hypertensive cardiovascular disease  Erectile dysfunction  Syncope likely related to carotid sinus sensitivity and vagal response.     Recommendations:  Continuation of his current cardiovascular regimen at the present time.     This includes antihypertensives, statin, and antiplatelet therapy  Follow-up with CT surgery as scheduled  Follow-up in 6 months time, sooner should there be difficulties    Diagnoses and all orders for this visit:    1. Chronic coronary artery disease (Primary)  -     clopidogrel (PLAVIX) 75 MG tablet; Take 1 tablet by mouth Daily.  Dispense: 90 tablet; Refill: 3    2. Primary hypertension    3. Hypertensive heart disease without heart failure    4. Long term (current) use of antithrombotics/antiplatelets    5. Dyslipidemia  -     atorvastatin (LIPITOR) 20 MG tablet; Take 1 tablet by mouth Daily.  Dispense: 90 tablet; Refill: 3    6. Essential hypertension  -     metoprolol succinate XL (TOPROL-XL) 50 MG 24 hr tablet; Take 1 tablet by mouth Daily.  Dispense: 90 tablet; Refill: 3    Other orders  -     sildenafil (VIAGRA) 25 MG tablet; Take 1 tablet by mouth Daily As Needed for Erectile Dysfunction.  Dispense: 20 tablet; Refill:  "3                  Objective:    Vitals:  Vitals:    07/29/25 1048   BP: 113/68   BP Location: Left arm   Patient Position: Sitting   Cuff Size: Large Adult   Pulse: 71   Resp: 16   SpO2: 97%   Weight: 121 kg (267 lb)   Height: 182.9 cm (72\")           Body mass index is 36.21 kg/m².      Physical Exam:    General: Alert, cooperative, no distress, appears stated age  Head:  Normocephalic, atraumatic, mucous membranes moist  Eyes:  Conjunctiva/corneas clear, EOM's intact     Neck:  Supple,  no bruit    Lungs: Clear to auscultation bilaterally, no wheezes rhonchi rales are noted  Chest wall: No tenderness  Heart::  Regular rate and rhythm, S1 and S2 normal, 1/6 holosystolic murmur.  No rub or gallop  Abdomen: Soft, non-tender, nondistended bowel sounds active  Extremities: No cyanosis, clubbing, or edema  Pulses: 2+ and symmetric all extremities  Skin:  No rashes or lesions  Neuro/psych: A&O x3. CN II through XII are grossly intact with appropriate affect      Allergies:  No Known Allergies    Medication Review:     Current Outpatient Medications:     aspirin 325 MG tablet, Take 1 tablet by mouth Daily., Disp: , Rfl:     atorvastatin (LIPITOR) 20 MG tablet, Take 1 tablet by mouth Daily., Disp: 90 tablet, Rfl: 3    Biotin 1000 MCG tablet, Take 1,000 mcg by mouth Daily., Disp: , Rfl:     clopidogrel (PLAVIX) 75 MG tablet, Take 1 tablet by mouth Daily., Disp: 90 tablet, Rfl: 3    Edarbyclor 40-12.5 MG tablet, TAKE 1 TABLET BY MOUTH ONCE DAILY, Disp: 90 tablet, Rfl: 0    metoprolol succinate XL (TOPROL-XL) 50 MG 24 hr tablet, Take 1 tablet by mouth Daily., Disp: 90 tablet, Rfl: 3    Omega-3 Fatty Acids (fish oil) 1000 MG capsule capsule, FISH OIL 1000 MG CAPS, Disp: , Rfl:     Potassium 99 MG tablet, Take  by mouth., Disp: , Rfl:     sildenafil (VIAGRA) 25 MG tablet, Take 1 tablet by mouth Daily As Needed for Erectile Dysfunction., Disp: 20 tablet, Rfl: 3    vitamin D3 125 MCG (5000 UT) capsule capsule, Take 1 capsule by " mouth Daily., Disp: , Rfl:     Family History:  Family History   Problem Relation Age of Onset    Colon cancer Mother     Parkinsonism Father        Past Medical History:  Past Medical History:   Diagnosis Date    Aneurysm     Atrial fibrillation     Coagulopathy     Coronary artery disease     Dyslipidemia     Hyperlipidemia     Hypertension     Hypertensive cardiovascular disease     Obesity        Past Surgical History:  Past Surgical History:   Procedure Laterality Date    CARDIAC CATHETERIZATION      CARDIOVERSION  2015    CAROTID STENT      CORONARY ANGIOPLASTY WITH STENT PLACEMENT  2015    Xience Alpine stent to ostial RCA- [facility]    KNEE SURGERY Left 2012    SHOULDER SURGERY Left        Social History:  Social History     Socioeconomic History    Marital status:    Tobacco Use    Smoking status: Former     Current packs/day: 0.00     Average packs/day: 1.5 packs/day for 25.7 years (38.5 ttl pk-yrs)     Types: Cigarettes     Start date: 1966     Quit date: 1991     Years since quittin.6     Passive exposure: Past    Smokeless tobacco: Never   Vaping Use    Vaping status: Never Used   Substance and Sexual Activity    Alcohol use: Yes     Alcohol/week: 14.0 standard drinks of alcohol     Types: 14 Cans of beer per week    Drug use: No    Sexual activity: Yes     Partners: Female       Review of Systems:  The following systems were reviewed as they relate to the cardiovascular system: Constitutional, Eyes, ENT, Cardiovascular, Respiratory, Gastrointestinal, Integumentary, Neurological, Psychiatric, Hematologic, Endocrine, Musculoskeletal, and Genitourinary. The pertinent cardiovascular findings are reported above with all other cardiovascular points within those systems being negative.    Diagnostic Study Review:     Current Electrocardiogram:  Procedures normal sinus rhythm with a ventricular rate of 71 bpm.  Sinus arrhythmia.  Early R wave transition.   "Normal QT and QTc intervals.    Laboratory Data:  Lab Results   Component Value Date    GLUCOSE 114 (H) 09/20/2019    BUN 19 09/20/2019    CREATININE 1.00 08/25/2023    EGFRIFNONA 74 09/20/2019    BCR 19.0 09/20/2019    K 4.4 09/20/2019    CO2 25.0 09/20/2019    CALCIUM 8.8 (L) 09/20/2019    ALBUMIN 3.80 09/20/2019    AST 21 09/20/2019    ALT 17 09/20/2019     Lab Results   Component Value Date    GLUCOSE 114 (H) 09/20/2019    CALCIUM 8.8 (L) 09/20/2019     09/20/2019    K 4.4 09/20/2019    CO2 25.0 09/20/2019     09/20/2019    BUN 19 09/20/2019    CREATININE 1.00 08/25/2023    EGFRIFNONA 74 09/20/2019    BCR 19.0 09/20/2019    ANIONGAP 12.4 09/20/2019     Lab Results   Component Value Date    WBC 5.60 09/20/2019    HGB 13.2 09/20/2019    HCT 38.3 09/20/2019    MCV 91.5 09/20/2019     09/20/2019     Lab Results   Component Value Date    CHOL 165 09/20/2019    TRIG 46 09/20/2019    HDL 55 09/20/2019     (H) 09/20/2019     No results found for: \"HGBA1C\"  No results found for: \"INR\", \"PROTIME\"    Most Recent Echo:  Results for orders placed during the hospital encounter of 08/25/23    Adult Transthoracic Echo Complete w/ Color, Spectral and Contrast if necessary per protocol 08/26/2023  4:42 PM    Interpretation Summary    Left ventricular systolic function is normal. Left ventricular ejection fraction appears to be 56 - 60%.    Left ventricular wall thickness is consistent with mild concentric hypertrophy.       Most Recent Stress Test:  Results for orders placed during the hospital encounter of 05/26/21    Stress Test With Myocardial Perfusion One Day 05/26/2021 11:11 AM    Interpretation Summary  · Myocardial perfusion imaging indicates a normal myocardial perfusion study with no evidence of ischemia.  · Left ventricular ejection fraction is normal. (Calculated EF = 60%).  · Impressions are consistent with a low risk study.       Most Recent Cardiac Catheterization:   No results found for " "this or any previous visit.       NOTE:     Today,07/29/2025 ,the following portions of the patient's note were reviewed, confirmed and/or updated as appropriate: History of present illness/Interval history, physical examination, assessment & plan, allergies, current medications, past family history, past medical history, past social history, past surgical history and problem list.    Labs pertinent to today's visit on 07/29/2025 (including but not limited to CBC, CMP, and lipid profiles) were requested from the patient's primary care provider/hospital/clinical laboratory.  If the labs were available for the visit, they were reviewed with the patient.  If they were not available, when received, special interest will be made to the labs pertinent to this visit.  The patient's most recent \"in-house\" labs are noted below and have been reviewed.  Outside labs pertinent to this visit are scanned into the record and have been reviewed.    Discussions held today, 07/29/2025,regarding procedures included risk, benefits, and options including but not limited to: Death, MI, stroke, pain, bleeding, infection, and possible need for vascular/thoracic/cardiothoracic surgery.    Copied information within this note was reviewed and is current as of 07/29/2025.    Assessment and plan noted herein represents the current plan of care as of 07/29/2025.    Significant resources from our office and staff are inherent in engaging this patient today,07/29/2025,and in a continuous and active collaborative plan of care related to their chronic cardiovascular conditions outlined herein.  The management of these conditions requires the direction of our service with specialized clinical knowledge, skills, and experience.  This collaborative care includes but is not limited to patient education, expectations and responsibilities, shared decision making around therapeutic goals, and shared commitments to achieve those goals.  "

## 2025-08-11 ENCOUNTER — HOSPITAL ENCOUNTER (OUTPATIENT)
Dept: CT IMAGING | Facility: HOSPITAL | Age: 75
Discharge: HOME | End: 2025-08-11
Payer: MEDICARE

## 2025-08-11 DIAGNOSIS — I71.21 ANEURYSM OF ASCENDING AORTA WITHOUT RUPTURE: ICD-10-CM

## 2025-08-11 PROCEDURE — 71250 CT THORAX DX C-: CPT
